# Patient Record
Sex: FEMALE | Race: WHITE | Employment: OTHER | URBAN - METROPOLITAN AREA
[De-identification: names, ages, dates, MRNs, and addresses within clinical notes are randomized per-mention and may not be internally consistent; named-entity substitution may affect disease eponyms.]

---

## 2017-03-24 DIAGNOSIS — R30.0 DYSURIA: Primary | ICD-10-CM

## 2017-03-24 LAB
ALBUMIN UR-MCNC: ABNORMAL MG/DL
APPEARANCE UR: ABNORMAL
BACTERIA #/AREA URNS HPF: ABNORMAL /HPF
BILIRUB UR QL STRIP: NEGATIVE
COLOR UR AUTO: ABNORMAL
GLUCOSE UR STRIP-MCNC: NEGATIVE MG/DL
HGB UR QL STRIP: ABNORMAL
KETONES UR STRIP-MCNC: NEGATIVE MG/DL
LEUKOCYTE ESTERASE UR QL STRIP: ABNORMAL
NITRATE UR QL: POSITIVE
NON-SQ EPI CELLS #/AREA URNS LPF: ABNORMAL /LPF
PH UR STRIP: 6 PH (ref 5–7)
RBC #/AREA URNS AUTO: ABNORMAL /HPF (ref 0–2)
SP GR UR STRIP: 1.01 (ref 1–1.03)
URN SPEC COLLECT METH UR: ABNORMAL
UROBILINOGEN UR STRIP-ACNC: 0.2 EU/DL (ref 0.2–1)
WBC #/AREA URNS AUTO: ABNORMAL /HPF (ref 0–2)

## 2017-03-24 PROCEDURE — 87186 SC STD MICRODIL/AGAR DIL: CPT | Performed by: FAMILY MEDICINE

## 2017-03-24 PROCEDURE — 81001 URINALYSIS AUTO W/SCOPE: CPT | Performed by: FAMILY MEDICINE

## 2017-03-24 PROCEDURE — 87088 URINE BACTERIA CULTURE: CPT | Performed by: FAMILY MEDICINE

## 2017-03-24 PROCEDURE — 87086 URINE CULTURE/COLONY COUNT: CPT | Performed by: FAMILY MEDICINE

## 2017-03-26 LAB
BACTERIA SPEC CULT: ABNORMAL
MICRO REPORT STATUS: ABNORMAL
MICROORGANISM SPEC CULT: ABNORMAL
SPECIMEN SOURCE: ABNORMAL

## 2017-03-27 ENCOUNTER — APPOINTMENT (OUTPATIENT)
Dept: CT IMAGING | Facility: CLINIC | Age: 77
End: 2017-03-27
Attending: FAMILY MEDICINE
Payer: COMMERCIAL

## 2017-03-27 ENCOUNTER — APPOINTMENT (OUTPATIENT)
Dept: GENERAL RADIOLOGY | Facility: CLINIC | Age: 77
End: 2017-03-27
Attending: FAMILY MEDICINE
Payer: COMMERCIAL

## 2017-03-27 ENCOUNTER — HOSPITAL ENCOUNTER (EMERGENCY)
Facility: CLINIC | Age: 77
Discharge: HOME | End: 2017-03-27
Attending: FAMILY MEDICINE | Admitting: FAMILY MEDICINE
Payer: COMMERCIAL

## 2017-03-27 VITALS
OXYGEN SATURATION: 96 % | WEIGHT: 115 LBS | SYSTOLIC BLOOD PRESSURE: 108 MMHG | DIASTOLIC BLOOD PRESSURE: 58 MMHG | TEMPERATURE: 98.3 F | BODY MASS INDEX: 22.46 KG/M2

## 2017-03-27 DIAGNOSIS — S00.83XA TRAUMATIC HEMATOMA OF FOREHEAD, INITIAL ENCOUNTER: ICD-10-CM
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
## 2017-03-27 DIAGNOSIS — M25.561 ACUTE PAIN OF RIGHT KNEE: ICD-10-CM

## 2017-03-27 DIAGNOSIS — M25.521 RIGHT ELBOW PAIN: ICD-10-CM
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
## 2017-03-27 PROCEDURE — 73562 X-RAY EXAM OF KNEE 3: CPT | Mod: TC,RT

## 2017-03-27 PROCEDURE — 70450 CT HEAD/BRAIN W/O DYE: CPT

## 2017-03-27 PROCEDURE — 72125 CT NECK SPINE W/O DYE: CPT

## 2017-03-27 PROCEDURE — 73060 X-RAY EXAM OF HUMERUS: CPT | Mod: TC,RT

## 2017-03-27 PROCEDURE — 73080 X-RAY EXAM OF ELBOW: CPT | Mod: TC,RT

## 2017-03-27 PROCEDURE — 99284 EMERGENCY DEPT VISIT MOD MDM: CPT | Performed by: FAMILY MEDICINE

## 2017-03-27 PROCEDURE — 99284 EMERGENCY DEPT VISIT MOD MDM: CPT | Mod: 25

## 2017-03-27 NOTE — ED AVS SNAPSHOT
" [facility] Emergency Department    [address] [place] [address]    Phone:  [phone]    Fax:  [phone]                                       Mickie Landin   MRN: [id number]    Department:  [facility] Emergency Department   Date of Visit:  3/27/2017           Patient Information     Date Of Birth          1940        Your diagnoses for this visit were:     Fall at home, initial encounter        You were seen by Moncho Carver MD.      Follow-up Information     Schedule an appointment as soon as possible for a visit with Indira Choudhary PA-C.    Why:  As needed    Contact information:    [facility]  [address] [address]  [phone]          Discharge Instructions         Treatment of swelling and pain after a fall     Rest an injury, elevate it, and use ice and compression as directed.   The xrays from today show that Mickie does not have any fractures. We recommend \"RICE\" for treatment of symptoms.     RICE stands for rest, ice, compression, and elevation. These can limit pain and swelling after an injury. RICE may be recommended to help treat fractures, sprains, strains, and bruises or bumps.   Home care  The following explain the details of RICE:    Rest. Limit the use of the injured body part. This helps prevent further damage to the body part and gives it time to heal. In some cases, you may need a sling, brace, splint, or cast to help keep the body part still until it has healed.    Ice. Applying ice right after an injury helps relieve pain and swelling. Wrap a bag of ice in a thin towel. Then, place it over the injured area. Do this for 10 to 15 minutes every 3 to 4 hours. Continue for the next 1 to 3 days or until your symptoms improve. Never put ice directly on your skin or ice an area longer than 15 minutes at a time.    Compression. Putting pressure on an injury helps reduce swelling and provides support. Wrap the injured " area firmly with an elastic bandage/wrap. Make sure not to wrap the bandage too tightly or you will cut off blood flow to the injured area. If your bandage loosens, rewrap it.    Elevation. Keeping an injury raised above the level of your heart reduces swelling, pain, and throbbing. For instance, if you have a broken leg, it may help to rest your leg on several pillows when sitting or lying down. Try to keep the injured area elevated for at least 2 to 3 hours per day.  Follow-up care  Follow up with your health care provider, or as advised.  When to seek medical advice  Call your health care provider right away if any of these occur:    Fever of 100.4 F (38 C) or higher, or as directed by your health care provider    Increased pain or swelling in the injured body part    Injured body part becomes cold, blue, or numb or tingly    Signs of infection. These include warmth in the skin, redness, drainage, or bad smell coming from the injured body part.    Thank you for choosing our Emergency Department for your care.     Sincerely,    Dr dE Carver M.D.          24 Hour Appointment Hotline       To make an appointment at any [facility], call [phone] ([phone]). If you don't have a family doctor or clinic, we will help you find one. [facility] clinics are conveniently located to serve the needs of you and your family.             Review of your medicines      Our records show that you are taking the medicines listed below. If these are incorrect, please call your family doctor or clinic.        Dose / Directions Last dose taken    albuterol (2.5 MG/3ML) 0.083% neb solution   Dose:  1 ampule   Quantity:  30 vial        Take 3 mLs (2.5 mg) by nebulization every 6 hours as needed for shortness of breath / dyspnea   Refills:  0        ascorbic acid 500 MG tablet   Commonly known as:  VITAMIN C        1 TABLET DAILY   Refills:  0        aspirin 81 MG tablet   Quantity:  100        ONE DAILY   Refills:  3         BIOTENE PBF DRY MOUTH Pste        use twice daily   Refills:  0        calcium-vitamin D 600-400 MG-UNIT per tablet   Commonly known as:  CALTRATE   Dose:  1 tablet        Take 1 tablet by mouth 2 times daily.   Refills:  0        * DEPAKOTE SPRINKLES 125 MG capsule   Generic drug:  divalproex        3 CAPSULES TWICE DAILY AT 8 AM AND 8 PM AND 2 CAPSULES AT NOON DAILY   Refills:  0        * divalproex 125 MG capsule   Commonly known as:  DEPAKOTE SPRINKLE        2 capsules at noon   Refills:  0        fentaNYL 12 mcg/hr 72 hr patch   Commonly known as:  DURAGESIC   Quantity:  10 patch        Place 1 patch every 72 hours as needed. Hold if lethargic   Refills:  0        fluticasone 50 MCG/ACT spray   Commonly known as:  FLONASE   Dose:  2 spray   Quantity:  1 Package        2 sprays by Both Nostrils route daily.   Refills:  12        glucosamine chondroitin 500 complex Caps        2 CAPSULES DAILY   Refills:  0        LANsoprazole 30 MG CR capsule   Commonly known as:  PREVACID   Dose:  30 mg   Quantity:  90 capsule        Take 1 capsule (30 mg) by mouth daily May sprinkle on food   Refills:  3        MIRALAX powder   Dose:  1 capful   Quantity:  510 g   Generic drug:  polyethylene glycol        Take 17 g by mouth daily.   Refills:  1        MULTIVITAMINS PO   Dose:  1 tablet        Take 1 tablet by mouth daily. Crushed and mixed with applesauce   Refills:  0        * order for DME   Quantity:  1 Device        Equipment being ordered: bed rails   Refills:  0        * order for DME   Quantity:  2 Device        Equipment being ordered: nebulizer mask   Refills:  1        THICK-IT #2 PO        Add to all liquids   Refills:  0        tolterodine 1 MG tablet   Commonly known as:  DETROL   Dose:  1 mg   Quantity:  180 tablet        Take 1 tablet by mouth 2 times daily.   Refills:  3        TYLENOL PM EXTRA STRENGTH  MG tablet   Quantity:  100   Generic drug:  diphenhydrAMINE-acetaminophen        1 TABLET AT  "BEDTIME AS NEEDED FOR SLEEP   Refills:  3        * Notice:  This list has 4 medication(s) that are the same as other medications prescribed for you. Read the directions carefully, and ask your doctor or other care provider to review them with you.            Procedures and tests performed during your visit     Cervical spine CT w/o contrast    Head CT w/o contrast    Humerus XR, G/E 2 views, right    Knee XR, 3 views, right    XR Elbow Right G/E 3 Views      Orders Needing Specimen Collection     None      Pending Results     Date and Time Order Name Status Description    3/27/2017 1143 XR Elbow Right G/E 3 Views Preliminary     3/27/2017 1143 Humerus XR, G/E 2 views, right Preliminary     3/27/2017 1143 Knee XR, 3 views, right Preliminary     3/27/2017 0956 Cervical spine CT w/o contrast Preliminary     3/27/2017 0956 Head CT w/o contrast Preliminary             Pending Culture Results     No orders found from 3/25/2017 to 3/28/2017.            Thank you for choosing [facility]       Thank you for choosing [facility] for your care. Our goal is always to provide you with excellent care. Hearing back from our patients is one way we can continue to improve our services. Please take a few minutes to complete the written survey that you may receive in the mail after you visit with us. Thank you!        [employer] Information     [facility] lets you send messages to your doctor, view your test results, renew your prescriptions, schedule appointments and more. To sign up, go to [email or web address] . Click on \"Log in\" on the left side of the screen, which will take you to the Welcome page. Then click on \"Sign up Now\" on the right side of the page.     You will be asked to enter the access code listed below, as well as some personal information. Please follow the directions to create your username and password.     Your access code is: [email or web address]  Expires: 2017  1:13 PM     Your access code will  in 90 days. If you " need help or a new code, please call your [facility] clinic or [phone].        Care EveryWhere ID     This is your Care EveryWhere ID. This could be used by other organizations to access your [facility] medical records  [id number]        After Visit Summary       This is your record. Keep this with you and show to your community pharmacist(s) and doctor(s) at your next visit.

## 2017-03-27 NOTE — ED NOTES
Presents from group home with care giver after fall from bed. Hit forehead on her joan lift on the floor and bruising noted. Pt is non verbal but does not appear to be in any distress. Care giver states the bed is about three feet off floor.

## 2017-03-27 NOTE — DISCHARGE INSTRUCTIONS
"  Treatment of swelling and pain after a fall     Rest an injury, elevate it, and use ice and compression as directed.   The xrays from today show that Mickie does not have any fractures. We recommend \"RICE\" for treatment of symptoms.     RICE stands for rest, ice, compression, and elevation. These can limit pain and swelling after an injury. RICE may be recommended to help treat fractures, sprains, strains, and bruises or bumps.   Home care  The following explain the details of RICE:    Rest. Limit the use of the injured body part. This helps prevent further damage to the body part and gives it time to heal. In some cases, you may need a sling, brace, splint, or cast to help keep the body part still until it has healed.    Ice. Applying ice right after an injury helps relieve pain and swelling. Wrap a bag of ice in a thin towel. Then, place it over the injured area. Do this for 10 to 15 minutes every 3 to 4 hours. Continue for the next 1 to 3 days or until your symptoms improve. Never put ice directly on your skin or ice an area longer than 15 minutes at a time.    Compression. Putting pressure on an injury helps reduce swelling and provides support. Wrap the injured area firmly with an elastic bandage/wrap. Make sure not to wrap the bandage too tightly or you will cut off blood flow to the injured area. If your bandage loosens, rewrap it.    Elevation. Keeping an injury raised above the level of your heart reduces swelling, pain, and throbbing. For instance, if you have a broken leg, it may help to rest your leg on several pillows when sitting or lying down. Try to keep the injured area elevated for at least 2 to 3 hours per day.  Follow-up care  Follow up with your health care provider, or as advised.  When to seek medical advice  Call your health care provider right away if any of these occur:    Fever of 100.4 F (38 C) or higher, or as directed by your health care provider    Increased pain or swelling in the " injured body part    Injured body part becomes cold, blue, or numb or tingly    Signs of infection. These include warmth in the skin, redness, drainage, or bad smell coming from the injured body part.    Thank you for choosing our Emergency Department for your care.     Sincerely,    Dr Ed Carver M.D.

## 2017-03-27 NOTE — ED NOTES
Discussion with care giver. Luz Maria agrees that patient should have xray's of right knee and elbow. Pt unable to report pain. Bruising and swelling noted to affected areas

## 2017-03-27 NOTE — ED AVS SNAPSHOT
[facility] Emergency Department    [address] [place] [address]    Phone:  [phone]    Fax:  [phone]                                       Mickie Landin   MRN: [id number]    Department:  [facility] Emergency Department   Date of Visit:  3/27/2017           After Visit Summary Signature Page     I have received my discharge instructions, and my questions have been answered. I have discussed any challenges I see with this plan with the nurse or doctor.    ..........................................................................................................................................  Patient/Patient Representative Signature      ..........................................................................................................................................  Patient Representative Print Name and Relationship to Patient    ..................................................               ................................................  Date                                            Time    ..........................................................................................................................................  Reviewed by Signature/Title    ...................................................              ..............................................  Date                                                            Time

## 2017-03-27 NOTE — ED PROVIDER NOTES
History     Chief Complaint   Patient presents with     Fall     The history is provided by a caregiver.     Mickie Landin is a 77 year old female who lives at a group home in [place].  Today they were transferring her with a Trung lift and when the staff was trying to get the lift out from under Mickie, she fell forward onto the floor.  She hit her forehead, her right arm and her right knee.  Mickie is not verbal so they brought her in for evaluation since they could not determine if she had significant pain.     I have reviewed the Medications, Allergies, Past Medical and Surgical History, and Social History in the Epic system.    Review of Systems   Unable to perform ROS: Patient nonverbal       Physical Exam   BP: 108/58  Heart Rate: 73  Temp: 98.3  F (36.8  C)  Weight: 52.2 kg (115 lb)  SpO2: 96 %    Physical Exam   Constitutional: No distress.   HENT:   Head: Normocephalic and atraumatic.   Right Ear: External ear normal.   Left Ear: External ear normal.   Mouth/Throat: Oropharynx is clear and moist.   Small hematoma on the left forehead   Eyes: Conjunctivae and EOM are normal.   Neck: Normal range of motion.   Cardiovascular: Normal rate and normal heart sounds.    No murmur heard.  Pulmonary/Chest: Effort normal and breath sounds normal. No respiratory distress.   Abdominal: Soft. Bowel sounds are normal.   Musculoskeletal:   She has some swelling of the right humerus and right elbow.  No obvious deformity.  She has some swelling of the right knee, no obvious deformity.   Neurological: She is alert.   Skin:   She has a hematoma on the forehead over the left eye.  She has skin abrasions on the right humerus and right elbow.  No active bleeding.  She has skin abrasions on the right knee with no active bleeding.  No other skin abrasions or bleeding.       ED Course     ED Course     Procedures    Results for orders placed or performed during the hospital encounter of 03/27/17 (from the past 24 hour(s))    Cervical spine CT w/o contrast    Narrative    CT OF THE CERVICAL SPINE WITHOUT CONTRAST   3/27/2017 10:58 AM     COMPARISON: None.    HISTORY: Unprotected fall.     TECHNIQUE: Axial images of the cervical spine were acquired without  intravenous contrast. Multiplanar reformations were created.      FINDINGS: There is normal alignment of the cervical vertebrae;  however, there is straightening of normal cervical lordosis. Vertebral  body heights of the cervical spine are normal. Craniocervical  alignment is normal. There are no fractures of the cervical spine.  There is severe degenerative endplate spurring at all levels of the  cervical spine with the exception of the relatively normal-appearing  C2-C3 level. There is severe facet arthropathy throughout the   cervical spine. There is moderate degenerative spinal canal narrowing  from C3 down to C6.      Impression    IMPRESSION: Severe diffuse degenerative changes of the cervical spine  as described above. No evidence for fracture or traumatic  malalignment.      Radiation dose for this scan was reduced using automated exposure  control, adjustment of the mA and/or kV according to patient size, or  iterative reconstruction technique.   Head CT w/o contrast    Narrative    CT OF THE HEAD WITHOUT CONTRAST 3/27/2017 10:59 AM     COMPARISON: None.    HISTORY: Unprotected fall.    TECHNIQUE: 5 mm thick axial CT images of the head were acquired  without IV contrast material.    FINDINGS:  There is moderate diffuse cerebral volume loss. There are  subtle patchy areas of decreased density in the cerebral white matter  bilaterally that are consistent with sequela of chronic small vessel  ischemic disease.    The ventricles and basal cisterns are within normal limits in  configuration given the degree of cerebral volume loss.  There is no  midline shift. There are no extra-axial fluid collections.    No intracranial hemorrhage, mass or recent infarct.    The visualized  paranasal sinuses are well-aerated. There is no  mastoiditis. There are no fractures of the visualized bones.      Impression    IMPRESSION:  Diffuse cerebral volume loss and cerebral white matter  changes consistent with chronic small vessel ischemic disease. No  evidence for acute intracranial pathology.       Radiation dose for this scan was reduced using automated exposure  control, adjustment of the mA and/or kV according to patient size, or  iterative reconstruction technique.   Knee XR, 3 views, right    Narrative    RIGHT KNEE THREE VIEWS   3/27/2017 12:19 PM     HISTORY: Pain.    COMPARISON: Contralateral (left) knee x-rays dated 6/16/2009.    FINDINGS: There is diffuse osteopenia. No fracture or malalignment is  identified. Mild enthesopathic changes are seen at the quadriceps  tendon insertion into the patella. Joint spaces are somewhat difficult  to evaluate given the angulation of the images.      Impression    IMPRESSION:  1. No acute fracture or malalignment.  2. Diffuse osteopenia.  3. Enthesopathic changes of the quadriceps tendon insertion into the  patella.   Humerus XR, G/E 2 views, right    Narrative    RIGHT HUMERUS TWO OR MORE VIEWS   3/27/2017 12:20 PM     HISTORY: Pain.    COMPARISON: None.    FINDINGS:  No acute fracture or malalignment. There is diffuse  osteopenia. Large osteophytes are seen at the humeral head with  subchondral cyst formation also noted in the glenoid and possible  humeral head.      Impression    IMPRESSION:   1. No acute fracture or dislocation.  2. Moderate degenerative changes of the right shoulder.  3. Diffuse osteopenia.    XR Elbow Right G/E 3 Views    Narrative    RIGHT ELBOW THREE OR MORE VIEWS   3/27/2017 12:21 PM     HISTORY: Fall.    COMPARISON: None.    FINDINGS:  No acute fractures or dislocations. Alignment is anatomic.  Soft tissues are normal.   No anterior or posterior fat pad elevation  to suggest occult fracture. Enthesopathic changes are seen at  the  triceps tendon insertion into the olecranon process.      Impression    IMPRESSION: No fractures or dislocations. Enthesopathic changes of the  triceps tendon insertion into the olecranon are noted.         Medications - No data to display      Assessments & Plan (with Medical Decision Making)  Mickie came to the emergency department today after she fell at her group home.  They were moving her with a Trung lift and she tumbled out of bed forward.  She landed on her forehead, right arm and right knee.  She is nonverbal so they brought her in for evaluation because is very difficult to ascertain how seriously she was injured.  CT scan of the head and neck was normal.  X-rays of the right elbow, right humerus and right knee were normal.  The patient seems to be in her usual state of health.  Luz Maria, one of her long-term caregivers, is here with her and thinks that Mickie is stable.  She was discharged from the ED.       I have reviewed the nursing notes.    I have reviewed the findings, diagnosis, plan and need for follow up with the patient.    New Prescriptions    No medications on file       Final diagnoses:   Fall at home, initial encounter     Final Diagnosis:  - Fall at home  - right elbow pain  - acute right knee pain  - traumatic hematoma to forehead      3/27/2017   [facility] EMERGENCY DEPARTMENT     Moncho Carver MD  03/27/17 1320       Moncho Carver MD  03/31/17 1722

## 2017-04-14 ENCOUNTER — OFFICE VISIT (OUTPATIENT)
Dept: SURGERY | Facility: OTHER | Age: 77
End: 2017-04-14
Payer: COMMERCIAL

## 2017-04-14 ENCOUNTER — TELEPHONE (OUTPATIENT)
Dept: SURGERY | Facility: OTHER | Age: 77
End: 2017-04-14

## 2017-04-14 VITALS — RESPIRATION RATE: 16 BRPM | HEART RATE: 72 BPM | TEMPERATURE: 97.6 F

## 2017-04-14 DIAGNOSIS — S31.819A BUTTOCK WOUND, RIGHT, INITIAL ENCOUNTER: Primary | ICD-10-CM

## 2017-04-14 DIAGNOSIS — S31.829A BUTTOCK WOUND, LEFT, INITIAL ENCOUNTER: ICD-10-CM

## 2017-04-14 PROCEDURE — 99213 OFFICE O/P EST LOW 20 MIN: CPT | Performed by: SPECIALIST

## 2017-04-14 NOTE — NURSING NOTE
Estimated body mass index is 22.46 kg/(m^2) as calculated from the following:    Height as of 10/11/11: 1.524 m (5').    Weight as of 3/27/17: 52.2 kg (115 lb).  BP Readings from Last 1 Encounters:   03/27/17 108/58   ]  BP cuff size:  NA (Not Taken)  Do you feel safe in your environment?  Unable to answer  Does the patient need any medication refills today? No    No weight patient wheelchair bound

## 2017-04-14 NOTE — NURSING NOTE
Pt here for f/u of buttocks wounds. Has kissing wounds on buttocks as well as skin opening in crease. The open skin is clean and red. On kissing wounds the left side has open skin that is red and clean, the right side has crusty covering.  Non adhesive foam narrow strip placed between buttocks. Order faxed the [facility] for interdry roll. KASANDRA Ann

## 2017-04-14 NOTE — PROGRESS NOTES
F/U for Buttock wound.    Subjective:  Patient is a 75-year-old developmentally delayed white female who lives in a group home and spends most of her time in a custom-made wheelchair. She is here today with her caregiver.  She was last seen by me in 2015 and the wounds were healed.  The wheelchair was evaluated at [facility] at that time and was found to give adequate offloading.      The wounds reopened several months ago.  They have been treated with telfa and bacitracin without relief    She now presents again for follow-up.    Objective:  B/P: Data Unavailable, T: 97.6, P: 72, R: 16  Back: Kissing left and white buttock wounds. - left 0.5x0.8cm, right 1x0.5cm  Psych: non verbal  Neuro: lower ext contracted      Assessmentplan:  This is a 75-year-old the developmentally delayed lady who spends most of her time in a wheelchair.  She developed new kissing lesions on her buttocks and I suspect are due to them rubbing.   The plan is start interdry to the area and have her chair remapped.  She will f/u with me in 3 weeks.    Sahil Salinas MD, FACS

## 2017-04-14 NOTE — TELEPHONE ENCOUNTER
I spoke with Dr. Salinas. He ordered tegederm to be placed on each buttocks and nu skin to be placed on sacral wound. I have spoken to Luz Maria, I will write up the orders for these and fax it to the group home. Orders were faxed to [facility]. KASANDRA Ann

## 2017-04-14 NOTE — TELEPHONE ENCOUNTER
Reason for call:  Anita from [facility] calling to report that the interdry is not covered by the pt's ins and would cost her $109 a roll.  She requests a different supply be ordered.  Please call anita to advise.  thanks

## 2017-04-14 NOTE — LETTER
[facility]  [address]  [place] [address]  [phone]        April 14, 2017    Re:Mickie Ballardody       [id number]       [address]       [place] [address]          To Whom It May Concern,    Orders for wound care:   Tegaderm [id number] to be placed on each buttocks wound, cut product in half, change weekly or as needed if the edges start to come loose                                          Nu skin to sacral wound daily and prn    Sincerely,        Dr. ТАТЬЯНА Salinas MD

## 2017-04-14 NOTE — MR AVS SNAPSHOT
"              After Visit Summary   4/14/2017    Mickie Landin    MRN: [id number]           Patient Information     Date Of Birth          1940        Visit Information        Provider Department      4/14/2017 9:15 AM Sahil Salinas MD [facility]        Today's Diagnoses     Buttock wound, right, initial encounter    -  1    Buttock wound, left, initial encounter           Follow-ups after your visit        Your next 10 appointments already scheduled     May 05, 2017  9:30 AM CDT   Return Visit with Sahil Salinas MD   [facility]    [address]  [place] [id number] [phone]              Who to contact     If you have questions or need follow up information about today's clinic visit or your schedule please contact [facility] directly at [phone].  Normal or non-critical lab and imaging results will be communicated to you by MyChart, letter or phone within 4 business days after the clinic has received the results. If you do not hear from us within 7 days, please contact the clinic through MyChart or phone. If you have a critical or abnormal lab result, we will notify you by phone as soon as possible.  Submit refill requests through [employer] or call your pharmacy and they will forward the refill request to us. Please allow 3 business days for your refill to be completed.          Additional Information About Your Visit        MyChart Information     [employer] lets you send messages to your doctor, view your test results, renew your prescriptions, schedule appointments and more. To sign up, go to [email or web address] . Click on \"Log in\" on the left side of the screen, which will take you to the Welcome page. Then click on \"Sign up Now\" on the right side of the page.     You will be asked to enter the access code listed below, as well as some personal information. Please follow the directions to " create your username and password.     Your access code is: [email or web address]  Expires: 2017  1:13 PM     Your access code will  in 90 days. If you need help or a new code, please call your [facility] or [phone].        Care EveryWhere ID     This is your Care EveryWhere ID. This could be used by other organizations to access your [place] medical records  [id number]        Your Vitals Were     Pulse Temperature Respirations             72 97.6  F (36.4  C) (Temporal) 16          Blood Pressure from Last 3 Encounters:   17 108/58   14 118/74   13 116/72    Weight from Last 3 Encounters:   17 115 lb (52.2 kg)   11/06/15 106 lb 4.8 oz (48.2 kg)   13 91 lb 8 oz (41.5 kg)              Today, you had the following     No orders found for display         Today's Medication Changes          These changes are accurate as of: 17  9:39 AM.  If you have any questions, ask your nurse or doctor.               Start taking these medicines.        Dose/Directions    * order for DME   Used for:  Buttock wound, right, initial encounter, Buttock wound, left, initial encounter   Started by:  Sahil Salinas MD        Equipment being ordered: Interdry Roll   Quantity:  1 Box   Refills:  3       * order for DME   Used for:  Buttock wound, right, initial encounter, Buttock wound, left, initial encounter   Started by:  Sahil Salinas MD        Equipment being ordered: Interdry roll.  Apply to buttock crease daily and PRN   Quantity:  1 Box   Refills:  3       * Notice:  This list has 2 medication(s) that are the same as other medications prescribed for you. Read the directions carefully, and ask your doctor or other care provider to review them with you.         Where to get your medicines      Some of these will need a paper prescription and others can be bought over the counter.  Ask your nurse if you have questions.     Bring a paper prescription for each of these medications      order for DME    order for DME                Primary Care Provider Office Phone # Fax #    Indira Choudhary PA-C [phone]       [facility] PHYSICIAN [facility] [address] [address]        Thank you!     Thank you for choosing [facility]  for your care. Our goal is always to provide you with excellent care. Hearing back from our patients is one way we can continue to improve our services. Please take a few minutes to complete the written survey that you may receive in the mail after your visit with us. Thank you!             Your Updated Medication List - Protect others around you: Learn how to safely use, store and throw away your medicines at www.disposemymeds.org.          This list is accurate as of: 4/14/17  9:39 AM.  Always use your most recent med list.                   Brand Name Dispense Instructions for use    albuterol (2.5 MG/3ML) 0.083% neb solution     30 vial    Take 3 mLs (2.5 mg) by nebulization every 6 hours as needed for shortness of breath / dyspnea       ascorbic acid 500 MG tablet    VITAMIN C     1 TABLET DAILY       aspirin 81 MG tablet     100    ONE DAILY       BIOTENE PBF DRY MOUTH Pste      use twice daily       calcium-vitamin D 600-400 MG-UNIT per tablet    CALTRATE     Take 1 tablet by mouth 2 times daily.       * DEPAKOTE SPRINKLES 125 MG capsule   Generic drug:  divalproex      3 CAPSULES TWICE DAILY AT 8 AM AND 8 PM AND 2 CAPSULES AT NOON DAILY       * divalproex 125 MG capsule    DEPAKOTE SPRINKLE     2 capsules at noon       fentaNYL 12 mcg/hr 72 hr patch    DURAGESIC    10 patch    Place 1 patch every 72 hours as needed. Hold if lethargic       fluticasone 50 MCG/ACT spray    FLONASE    1 Package    2 sprays by Both Nostrils route daily.       glucosamine chondroitin 500 complex Caps      2 CAPSULES DAILY       LANsoprazole 30 MG CR capsule    PREVACID    90 capsule    Take 1 capsule (30 mg) by mouth daily May sprinkle on food        MELATONIN PO          MIRALAX powder   Generic drug:  polyethylene glycol     510 g    Take 17 g by mouth daily.       MULTIVITAMINS PO      Take 1 tablet by mouth daily. Crushed and mixed with applesauce       * order for DME     1 Device    Equipment being ordered: bed rails       * order for DME     2 Device    Equipment being ordered: nebulizer mask       * order for DME     1 Box    Equipment being ordered: Interdry Roll       * order for DME     1 Box    Equipment being ordered: Interdry roll.  Apply to buttock crease daily and PRN       THICK-IT #2 PO      Add to all liquids       tolterodine 1 MG tablet    DETROL    180 tablet    Take 1 tablet by mouth 2 times daily.       TYLENOL PM EXTRA STRENGTH  MG tablet   Generic drug:  diphenhydrAMINE-acetaminophen     100    1 TABLET AT BEDTIME AS NEEDED FOR SLEEP       * Notice:  This list has 6 medication(s) that are the same as other medications prescribed for you. Read the directions carefully, and ask your doctor or other care provider to review them with you.

## 2017-04-17 ENCOUNTER — TELEPHONE (OUTPATIENT)
Dept: FAMILY MEDICINE | Facility: OTHER | Age: 77
End: 2017-04-17

## 2017-04-17 NOTE — TELEPHONE ENCOUNTER
Reason for call:  Form  Reason for Call:  Form, our goal is to have forms completed with 72 hours, however, some forms may require a visit or additional information.    Type of letter, form or note:  medical    Who is the form from?: [employer] (if other please explain)    Where did the form come from: form was faxed in    What clinic location was the form placed at?: [facility] - [phone]    Where the form was placed: 's Box    What number is listed as a contact on the form?: [phone]        Additional comments: sign fax back    Call taken on 4/17/2017 at 11:02 AM by Pratibha Willson

## 2017-04-18 ENCOUNTER — TELEPHONE (OUTPATIENT)
Dept: SURGERY | Facility: OTHER | Age: 77
End: 2017-04-18

## 2017-04-18 NOTE — TELEPHONE ENCOUNTER
Latisha from patient's group home would like to talk to someone about the directions for patient's scripts for Tegaderm and Nu skin. Please call Latisha at [phone].    Thank you Grace

## 2017-04-18 NOTE — TELEPHONE ENCOUNTER
Asking about tegederm drsg that it is getting wet.. I told staff person that they were sent the incorrect product because tegederm is clear with adhesive. Latisha will contact the pharmacy to see what happened. KASANDRA Ann

## 2017-04-18 NOTE — TELEPHONE ENCOUNTER
I haven't seen patient since 2014, I believe the group home has a contract with someone who provides in-home services.  I marked this on the form to redirect to her new provider, please send back.

## 2017-04-19 ENCOUNTER — TELEPHONE (OUTPATIENT)
Dept: FAMILY MEDICINE | Facility: OTHER | Age: 77
End: 2017-04-19

## 2017-04-19 NOTE — TELEPHONE ENCOUNTER
Reason for Call:  Form, our goal is to have forms completed with 72 hours, however, some forms may require a visit or additional information.    Type of letter, form or note:  medical    Who is the form from?: [facility] (if other please explain)    Where did the form come from: form was faxed in    What clinic location was the form placed at?: [facility] - [phone]    Where the form was placed: 's Box    What number is listed as a contact on the form?:[phone]       Additional comments: please review form,complete, sign date  And return to fax [phone]    Call taken on 4/19/2017 at 4:32 PM by aGil Walters

## 2017-04-19 NOTE — TELEPHONE ENCOUNTER
Form placed in Dr. Salinas's box as it is in regards to wound care with tagaderm and Dr. Salinas had just order the supplies. Routed to provider. Shirley Flores CMA

## 2017-04-24 ENCOUNTER — TELEPHONE (OUTPATIENT)
Dept: SURGERY | Facility: OTHER | Age: 77
End: 2017-04-24

## 2017-04-24 NOTE — TELEPHONE ENCOUNTER
Reason for call: Latisha is calling from the patients home care to talk with  or staff in regards to sores that got re-opened on buttocks. They were using the tegederm with adhesive on both buttocks, but taking it off took off the scab and developed a fresh new sore in the same area. Please advise.

## 2017-04-24 NOTE — LETTER
[facility]  [address]  [place] [address] [phone]        April 26, 2017    Re:Mickie Landin       [id number]      [place] [address]          To Whom It May Concern,    New wound care orders:   Apply Nu Skin to all 3 wounds-buttocks x2 and gluteal cleft.   Then placed Tegederm over all 3 wounds, can use one piece and cut to make it fit or just place one piece over all wounds if it will cover all.   Change this weekly or if edges are peeling and dressing is loose.    Sincerely,        Dr. ТАТЬЯНА Salinas MD

## 2017-04-24 NOTE — TELEPHONE ENCOUNTER
I spoke to Luz Maria. She states that scab that was on both buttocks wounds came off and both sites were bldg this am. Both areas were looking very good.   Being Dr. Salinas is out this week we will have to wait and talk with him next week to see if he has other suggestions for them. Luz Maria did say that the skin break in her gluteal cleft remains open, not much change.KASANDRA Ann

## 2017-04-26 NOTE — TELEPHONE ENCOUNTER
I spoke with MD today. He ordered putting Nu skin on all 3 wounds then cover with Tegederm, change when edges are peeling or product loose or weekly.  New orders written and faxed to [facility]audhry. KASANDRA Ann

## 2017-04-28 ENCOUNTER — TELEPHONE (OUTPATIENT)
Dept: SURGERY | Facility: OTHER | Age: 77
End: 2017-04-28

## 2017-04-28 NOTE — TELEPHONE ENCOUNTER
Reason for call: Latisha from the assistant living is calling to see if the tegaderm can be replaced with something else because it is not staying on at all due to being in a wheel and clinching. Please advise.

## 2017-04-28 NOTE — TELEPHONE ENCOUNTER
Reason for call:  tegaderm says replace daily but on referral it says weekly.   Luz Maria [facility]gers [facility] [phone]

## 2017-04-28 NOTE — TELEPHONE ENCOUNTER
I could offer her no sooner appt. I talked to Luz Maria. Pt is having trouble keeping tegederm in placed, she wears a brief and scoots around a lot in her chair so this will not help the tegederm to stay in place. Also has scratches on her buttocks also, not sure where that came from. Will need to see pt next week and see. KASANDRA Ann

## 2017-05-02 ENCOUNTER — MEDICAL CORRESPONDENCE (OUTPATIENT)
Dept: HEALTH INFORMATION MANAGEMENT | Facility: CLINIC | Age: 77
End: 2017-05-02

## 2017-05-02 ENCOUNTER — TELEPHONE (OUTPATIENT)
Dept: FAMILY MEDICINE | Facility: OTHER | Age: 77
End: 2017-05-02

## 2017-05-02 NOTE — TELEPHONE ENCOUNTER
Reason for Call:  Form, our goal is to have forms completed with 72 hours, however, some forms may require a visit or additional information.    Type of letter, form or note:  medical    Who is the form from?: [employer] (if other please explain)    Where did the form come from: form was faxed in    What clinic location was the form placed at?: [facility] - [phone]    Where the form was placed: 's Box    What number is listed as a contact on the form?: [phone]       Additional comments: signature required    Call taken on 5/2/2017 at 9:16 AM by Carlota Guthrie

## 2017-05-03 ENCOUNTER — TELEPHONE (OUTPATIENT)
Dept: SURGERY | Facility: OTHER | Age: 77
End: 2017-05-03

## 2017-05-03 NOTE — TELEPHONE ENCOUNTER
I talked to Luz Maria. They are having what sounds like skin breakdown from the adhesive on the tegederm. Pt moves around in her chair and this more than likely is causing friction and skin tearing, but as I explained to Luz Maria without us seeing it I feel we just need to wait to change things up until pt's appt in 2 days. KASANDRA Ann

## 2017-05-03 NOTE — TELEPHONE ENCOUNTER
Reason for call:    Regarding wound on her bottom. Discuss changing how they place the Tegaderm.    Caller Luz Maria group [facility]. [phone]

## 2017-05-05 ENCOUNTER — TELEPHONE (OUTPATIENT)
Dept: SURGERY | Facility: OTHER | Age: 77
End: 2017-05-05

## 2017-05-05 ENCOUNTER — TELEPHONE (OUTPATIENT)
Dept: NURSING | Facility: CLINIC | Age: 77
End: 2017-05-05

## 2017-05-05 ENCOUNTER — OFFICE VISIT (OUTPATIENT)
Dept: SURGERY | Facility: OTHER | Age: 77
End: 2017-05-05
Payer: COMMERCIAL

## 2017-05-05 VITALS — OXYGEN SATURATION: 95 % | HEART RATE: 92 BPM | TEMPERATURE: 97 F

## 2017-05-05 DIAGNOSIS — S31.000A WOUND OF SACRAL REGION, INITIAL ENCOUNTER: Primary | ICD-10-CM

## 2017-05-05 DIAGNOSIS — S31.000A: Primary | ICD-10-CM

## 2017-05-05 PROCEDURE — 99213 OFFICE O/P EST LOW 20 MIN: CPT | Performed by: SPECIALIST

## 2017-05-05 NOTE — TELEPHONE ENCOUNTER
Call Type: Triage Call    Presenting Problem: Group home attendant, Luz Maria, calling to see if the  clinic 'reordered the compound formula for butt paste for the  pharmacy.' Chart states RX addendum completed at 17:10 by CECI Higgins with [facility] by Dr Salinas.  Attendant will call pharmacy.  Triage Note:  Guideline Title: Medication Questions - Adult  Recommended Disposition: Provide Health Information  Original Inclination: Would have called clinic  Override Disposition:  Intended Action: Follow advice given  Physician Contacted: No  Caller has medication question(s) that was answered with available resources ?  YES  Pregnant and has medication questions regarding prescribed and/or nonprescribed  medication(s) not covered by available resources ? NO  Breastfeeding and has medication questions regarding prescribed and/or  nonprescribed medication(s) not covered by available resources ? NO  Requested information on how to safely dispose of  or unused medications ?  NO  Sign(s) or symptom(s) associated with a diagnosed condition or with a new illness  ? NO  Prescription ordered today and not available at pharmacy putting patient at  clinical risk ? NO  Recurrence of a symptom(s) or illness post prescribed medication treatment AND  provider instructed patient to call if symptom(s) returned. ? NO  Unable to obtain prescribed medication related to available resources AND  situation poses immediate clinical risk ? NO  Pharmacy calling to clarify prescription order. ? NO  Requests refill of prescribed medication that does NOT have a valid refill; lack  of medication may cause clinical risk to patient if not available. ? NO  Has questions about prescribed and/or nonprescribed medications not covered by  available resources ? NO  Pharmacy calling with prescription question; answered per department policy. ? NO  Requests refill of prescribed medication without valid refills OR requests refill  of prescribed medication with  valid refills but does not have prescription number  (no RX container); lack of medication does not put patient at clinical risk ? NO  Physician Instructions:  Care Advice:

## 2017-05-05 NOTE — PROGRESS NOTES
F/U for Buttock wound.    Subjective:  Patient is a 75-year-old developmentally delayed white female who lives in a group home and spends most of her time in a custom-made wheelchair. She is here today with her caregiver.  She was last seen by me in 2015 and the wounds were healed.  The wheelchair was evaluated at [facility] at that time and was found to give adequate offloading.      The wounds reopened several months ago.  They have been treated with tegaderm and nuskin    She now presents again for follow-up.    Objective:  B/P: Data Unavailable, T: 97, P: 92, R: Data Unavailable  Back: Kissing left and white buttock wounds. - healed - new sacral wound from maceration. - 3.4x4.5cm - 100% pink tissue.  Maceration a skin edges    Psych: non verbal  Neuro: lower ext contracted      Assessmentplan:  This is a 75-year-old the developmentally delayed lady who spends most of her time in a wheelchair.  She developed new kissing lesions on her buttocks and I suspect are due to them rubbing - those have healed.  New sacral wound developed from maceration.  .   The plan is start butt paste to new wound.   Stop tegaderm and nuskin.   She will f/u with me in 2 weeks.    Sahil Salinas MD, FACS

## 2017-05-05 NOTE — TELEPHONE ENCOUNTER
Please call [facility] back they need to know the active ingredients in the Butt Paste that was ordered so they can make the compound, thanks  Sheela MEDINA (R)

## 2017-05-05 NOTE — NURSING NOTE
Chief Complaint   Patient presents with     RECHECK     WOUND CARE     buttock wound       Initial Pulse 92  Temp 97  F (36.1  C) (Temporal)  SpO2 95% Estimated body mass index is 22.46 kg/(m^2) as calculated from the following:    Height as of 10/11/11: 1.524 m (5').    Weight as of 3/27/17: 52.2 kg (115 lb).  Medication Reconciliation: complete

## 2017-05-05 NOTE — TELEPHONE ENCOUNTER
Reason for Call:  Other prescription    Detailed comments: [facility] for [facility] of butt paste. Please advise and contact Luz Maria at  [phone]          Best Time: ANY    Can we leave a detailed message on this number? YES    Call taken on 5/5/2017 at 3:25 PM by Gail Walters

## 2017-05-05 NOTE — MR AVS SNAPSHOT
"              After Visit Summary   5/5/2017    Mickie Landin    MRN: [id number]           Patient Information     Date Of Birth          1940        Visit Information        Provider Department      5/5/2017 9:30 AM Sahil Salinas MD [facility]        Today's Diagnoses     Wound of sacral region, initial encounter    -  1       Follow-ups after your visit        Your next 10 appointments already scheduled     May 19, 2017  9:15 AM CDT   Return Visit with Sahil Salinas MD   [facility]    [address]  [place] [address] [phone]              Who to contact     If you have questions or need follow up information about today's clinic visit or your schedule please contact [facility] directly at [phone].  Normal or non-critical lab and imaging results will be communicated to you by Picurio[employer], letter or phone within 4 business days after the clinic has received the results. If you do not hear from us within 7 days, please contact the clinic through MyChart or phone. If you have a critical or abnormal lab result, we will notify you by phone as soon as possible.  Submit refill requests through [employer] or call your pharmacy and they will forward the refill request to us. Please allow 3 business days for your refill to be completed.          Additional Information About Your Visit        MyChart Information     [employer] lets you send messages to your doctor, view your test results, renew your prescriptions, schedule appointments and more. To sign up, go to [email or web address] . Click on \"Log in\" on the left side of the screen, which will take you to the Welcome page. Then click on \"Sign up Now\" on the right side of the page.     You will be asked to enter the access code listed below, as well as some personal information. Please follow the directions to create your username and password.     Your access " code is: [email or web address]  Expires: 2017  1:13 PM     Your access code will  in 90 days. If you need help or a new code, please call your [place] clinic or [phone].        Care EveryWhere ID     This is your Care EveryWhere ID. This could be used by other organizations to access your [place] medical records  [id number]        Your Vitals Were     Pulse Temperature Pulse Oximetry             92 97  F (36.1  C) (Temporal) 95%          Blood Pressure from Last 3 Encounters:   17 108/58   14 118/74   13 116/72    Weight from Last 3 Encounters:   17 115 lb (52.2 kg)   11/06/15 106 lb 4.8 oz (48.2 kg)   13 91 lb 8 oz (41.5 kg)              Today, you had the following     No orders found for display       Primary Care Provider Office Phone # Fax #    Indira Choudhary PA-C [phone]       [facility] PHYSICIAN [facility] [address] [address]        Thank you!     Thank you for choosing [facility]  for your care. Our goal is always to provide you with excellent care. Hearing back from our patients is one way we can continue to improve our services. Please take a few minutes to complete the written survey that you may receive in the mail after your visit with us. Thank you!             Your Updated Medication List - Protect others around you: Learn how to safely use, store and throw away your medicines at www.disposemymeds.org.          This list is accurate as of: 17  9:51 AM.  Always use your most recent med list.                   Brand Name Dispense Instructions for use    albuterol (2.5 MG/3ML) 0.083% neb solution     30 vial    Take 3 mLs (2.5 mg) by nebulization every 6 hours as needed for shortness of breath / dyspnea       ascorbic acid 500 MG tablet    VITAMIN C     1 TABLET DAILY       aspirin 81 MG tablet     100    ONE DAILY       BIOTENE PBF DRY MOUTH Pste      use twice daily       calcium-vitamin D 600-400 MG-UNIT per  tablet    CALTRATE     Take 1 tablet by mouth 2 times daily.       * DEPAKOTE SPRINKLES 125 MG capsule   Generic drug:  divalproex      3 CAPSULES TWICE DAILY AT 8 AM AND 8 PM AND 2 CAPSULES AT NOON DAILY       * divalproex 125 MG capsule    DEPAKOTE SPRINKLE     2 capsules at noon       fentaNYL 12 mcg/hr 72 hr patch    DURAGESIC    10 patch    Place 1 patch every 72 hours as needed. Hold if lethargic       fluticasone 50 MCG/ACT spray    FLONASE    1 Package    2 sprays by Both Nostrils route daily.       glucosamine chondroitin 500 complex Caps      2 CAPSULES DAILY       LANsoprazole 30 MG CR capsule    PREVACID    90 capsule    Take 1 capsule (30 mg) by mouth daily May sprinkle on food       MELATONIN PO          MIRALAX powder   Generic drug:  polyethylene glycol     510 g    Take 17 g by mouth daily.       MULTIVITAMINS PO      Take 1 tablet by mouth daily. Crushed and mixed with applesauce       * order for DME     1 Device    Equipment being ordered: bed rails       * order for DME     2 Device    Equipment being ordered: nebulizer mask       * order for DME     1 Box    Equipment being ordered: Interdry Roll       * order for DME     1 Box    Equipment being ordered: Interdry roll.  Apply to buttock crease daily and PRN       * order for DME     1 Box    Equipment being ordered: 4x6 tegaderm  - Apply to buttock daily       * order for DME     1 Box    Equipment being ordered: Nu skin - apply to sacral area daily and prn       THICK-IT #2 PO      Add to all liquids       tolterodine 1 MG tablet    DETROL    180 tablet    Take 1 tablet by mouth 2 times daily.       TYLENOL PM EXTRA STRENGTH  MG tablet   Generic drug:  diphenhydrAMINE-acetaminophen     100    1 TABLET AT BEDTIME AS NEEDED FOR SLEEP       * Notice:  This list has 8 medication(s) that are the same as other medications prescribed for you. Read the directions carefully, and ask your doctor or other care provider to review them with you.

## 2017-05-05 NOTE — NURSING NOTE
PT here for f/u of bottocks wounds. Have had several phone conversations with [facility] staff since last visit. The original wounds have healed, now appears to have open skin related to moisture, see maceration on edges of the wound bed, measures 3.4 x 4.5 cm. Color red. Tender to touch.   Coloplast thick paste applied. Instructed them to clean the wound bed but do not try to scrub off the film that will be left. KASANDRA Ann

## 2017-05-05 NOTE — LETTER
[address]  [facility] [address]  [phone]        May 8, 2017    Re:Mickie Ballardody       [id number] [address]       [address]      [facility] [address]          To Whom It May Concern,    DC the Butt paste order from 5/5/17.  Start Coloplast Critic Aid skin paste, thick moisture barrier paste to buttocks wound BID and prn.  [facility] pharmacy has been called with verbal order.    Sincerely,        Dr. ТАТЬЯНА Salinas MD

## 2017-05-08 NOTE — TELEPHONE ENCOUNTER
Called and gave verbal order from Dr. ТАТЬЯНА Salinas for Coloplast Critic Aid skin paste, thick moisture barrier paste. Faxed new order to the [facility]([phone]) for this change. KASANDRA Ann

## 2017-07-18 ENCOUNTER — MEDICAL CORRESPONDENCE (OUTPATIENT)
Dept: GENERAL RADIOLOGY | Facility: CLINIC | Age: 77
End: 2017-07-18

## 2017-11-10 ENCOUNTER — ALLIED HEALTH/NURSE VISIT (OUTPATIENT)
Dept: FAMILY MEDICINE | Facility: OTHER | Age: 77
End: 2017-11-10
Payer: COMMERCIAL

## 2017-11-10 DIAGNOSIS — Z23 NEED FOR PROPHYLACTIC VACCINATION AND INOCULATION AGAINST INFLUENZA: Primary | ICD-10-CM

## 2017-11-10 PROCEDURE — 90686 IIV4 VACC NO PRSV 0.5 ML IM: CPT

## 2017-11-10 PROCEDURE — 90471 IMMUNIZATION ADMIN: CPT

## 2017-11-10 PROCEDURE — 99207 ZZC NO CHARGE NURSE ONLY: CPT

## 2017-11-10 NOTE — MR AVS SNAPSHOT
"              After Visit Summary   11/10/2017    Mickie Landin    MRN: [id number]           Patient Information     Date Of Birth          1940        Visit Information        Provider Department      11/10/2017 11:00 AM NL FLU SHOT [facility]        Today's Diagnoses     Need for prophylactic vaccination and inoculation against influenza    -  1       Follow-ups after your visit        Who to contact     If you have questions or need follow up information about today's clinic visit or your schedule please contact [facility] directly at [phone].  Normal or non-critical lab and imaging results will be communicated to you by [employer], letter or phone within 4 business days after the clinic has received the results. If you do not hear from us within 7 days, please contact the clinic through [facility] or phone. If you have a critical or abnormal lab result, we will notify you by phone as soon as possible.  Submit refill requests through [facility] [facility] or call your pharmacy and they will forward the refill request to us. Please allow 3 business days for your refill to be completed.          Additional Information About Your Visit        MyChart Information     [facility] lets you send messages to your doctor, view your test results, renew your prescriptions, schedule appointments and more. To sign up, go to [email or web address] . Click on \"Log in\" on the left side of the screen, which will take you to the Welcome page. Then click on \"Sign up Now\" on the right side of the page.     You will be asked to enter the access code listed below, as well as some personal information. Please follow the directions to create your username and password.     Your access code is: [email or web address]  Expires: 2018  1:33 PM     Your access code will  in 90 days. If you need help or a new code, please call your [facility] or [phone].        Care EveryWhere ID     This is your Care " EveryWhere ID. This could be used by other organizations to access your [facility] medical records  [id number]         Blood Pressure from Last 3 Encounters:   03/27/17 108/58   04/23/14 118/74   11/22/13 116/72    Weight from Last 3 Encounters:   03/27/17 115 lb (52.2 kg)   11/06/15 106 lb 4.8 oz (48.2 kg)   01/04/13 91 lb 8 oz (41.5 kg)              We Performed the Following     FLU VAC, SPLIT VIRUS IM > 3 YO (QUADRIVALENT) [01149]     Vaccine Administration, Initial [56018]        Primary Care Provider Office Phone # Fax #    Indira Choudhary PA-C [phone]       [facility] PHYSICIAN [facility] [address] [facility] [address]        Equal Access to Services     DA MCNAIR : Hadii goran bryson Sokatrin, waaxda luqadaha, qaybta kaalmada adri, [place] . So [facility] [phone].    ATENCIÓN: Si habla español, tiene a day disposición servicios gratuitos de asistencia lingüística. [facility] [phone].    We comply with applicable federal civil rights laws and [place] laws. We do not discriminate on the basis of race, color, national origin, age, disability, sex, sexual orientation, or gender identity.            Thank you!     Thank you for choosing [facility]  for your care. Our goal is always to provide you with excellent care. Hearing back from our patients is one way we can continue to improve our services. Please take a few minutes to complete the written survey that you may receive in the mail after your visit with us. Thank you!             Your Updated Medication List - Protect others around you: Learn how to safely use, store and throw away your medicines at www.disposemymeds.org.          This list is accurate as of: 11/10/17  1:33 PM.  Always use your most recent med list.                   Brand Name Dispense Instructions for use Diagnosis    albuterol (2.5 MG/3ML) 0.083% neb solution     30 vial    Take 3 mLs (2.5 mg) by  nebulization every 6 hours as needed for shortness of breath / dyspnea    Wheezing       ascorbic acid 500 MG tablet    VITAMIN C     1 TABLET DAILY        aspirin 81 MG tablet     100    ONE DAILY    Edema       BIOTENE PBF DRY MOUTH Pste      use twice daily        BUTT PASTE - REGULAR    DR LOVE POOP GOOP BUTT PASTE FORMULA    16 oz    Apply topically 2 times daily Apply to buttocks wound BID and prn.    Open wound of sacroiliac region without complication       calcium-vitamin D 600-400 MG-UNIT per tablet    CALTRATE     Take 1 tablet by mouth 2 times daily.        * DEPAKOTE SPRINKLES 125 MG capsule   Generic drug:  divalproex      3 CAPSULES TWICE DAILY AT 8 AM AND 8 PM AND 2 CAPSULES AT NOON DAILY        * divalproex 125 MG capsule    DEPAKOTE SPRINKLE     2 capsules at noon        fentaNYL 12 mcg/hr 72 hr patch    DURAGESIC    10 patch    Place 1 patch every 72 hours as needed. Hold if lethargic    Pain, Spinal stenosis       fluticasone 50 MCG/ACT spray    FLONASE    1 Package    2 sprays by Both Nostrils route daily.    Chronic rhinitis       glucosamine chondroitin 500 complex Caps      2 CAPSULES DAILY        LANsoprazole 30 MG CR capsule    PREVACID    90 capsule    Take 1 capsule (30 mg) by mouth daily May sprinkle on food    Esophageal reflux       MELATONIN PO           MIRALAX powder   Generic drug:  polyethylene glycol     510 g    Take 17 g by mouth daily.        MULTIVITAMINS PO      Take 1 tablet by mouth daily. Crushed and mixed with applesauce        * order for DME     1 Device    Equipment being ordered: bed rails    Infantile cerebral palsy, unspecified, Unspecified epilepsy without mention of intractable epilepsy, Unspecified intellectual disabilities, Osteoarthrosis, unspecified whether generalized or localized, unspecified site, Essential and other specified forms of tremor, Osteopenia, Spinal stenosis       * order for DME     2 Device    Equipment being ordered: nebulizer mask     Wheezing, SOB (shortness of breath)       * order for DME     1 Box    Equipment being ordered: Interdry Roll    Buttock wound, right, initial encounter, Buttock wound, left, initial encounter       * order for DME     1 Box    Equipment being ordered: Interdry roll.  Apply to buttock crease daily and PRN    Buttock wound, right, initial encounter, Buttock wound, left, initial encounter       * order for DME     1 Box    Equipment being ordered: 4x6 tegaderm  - Apply to buttock daily    Buttock wound, right, initial encounter, Buttock wound, left, initial encounter       * order for DME     1 Box    Equipment being ordered: Nu skin - apply to sacral area daily and prn    Buttock wound, right, initial encounter, Buttock wound, left, initial encounter       * order for DME      coloplast Critic Aid skin paste, thick moisture barrier paste, apply to buttocks twice daily and as needed.        THICK-IT #2 PO      Add to all liquids        tolterodine 1 MG tablet    DETROL    180 tablet    Take 1 tablet by mouth 2 times daily.    Urine incontinence       TYLENOL PM EXTRA STRENGTH  MG tablet   Generic drug:  diphenhydrAMINE-acetaminophen     100    1 TABLET AT BEDTIME AS NEEDED FOR SLEEP    Infantile cerebral palsy, unspecified       * Notice:  This list has 9 medication(s) that are the same as other medications prescribed for you. Read the directions carefully, and ask your doctor or other care provider to review them with you.

## 2017-11-10 NOTE — NURSING NOTE
Injectable Influenza Immunization Documentation    1.  Is the person to be vaccinated sick today?  No    2. Does the person to be vaccinated have an allergy to eggs or to a component of the vaccine?  No    3. Has the person to be vaccinated today ever had a serious reaction to influenza vaccine in the past?  No    4. Has the person to be vaccinated ever had Guillain-[place] syndrome?  No     Form completed by Yesy Fernandes MA  Prior to injection verified patient identity using patient's name and date of birth.   Patient instructed to remain in clinic for 15 minutes afterwards, and to report any adverse reaction to me immediately.

## 2018-02-14 ENCOUNTER — APPOINTMENT (OUTPATIENT)
Dept: GENERAL RADIOLOGY | Facility: CLINIC | Age: 78
DRG: 871 | End: 2018-02-14
Attending: FAMILY MEDICINE
Payer: COMMERCIAL

## 2018-02-14 ENCOUNTER — HOSPITAL ENCOUNTER (INPATIENT)
Facility: CLINIC | Age: 78
LOS: 6 days | Discharge: NURSING, REHAB OR LONG-TERM CARE | DRG: 871 | End: 2018-02-20
Attending: FAMILY MEDICINE | Admitting: FAMILY MEDICINE
Payer: COMMERCIAL

## 2018-02-14 DIAGNOSIS — J69.0 ASPIRATION PNEUMONIA, UNSPECIFIED ASPIRATION PNEUMONIA TYPE, UNSPECIFIED LATERALITY, UNSPECIFIED PART OF LUNG (H): ICD-10-CM

## 2018-02-14 DIAGNOSIS — J44.1 COPD EXACERBATION (H): ICD-10-CM

## 2018-02-14 DIAGNOSIS — E86.0 DEHYDRATION: ICD-10-CM

## 2018-02-14 DIAGNOSIS — E16.2 HYPOGLYCEMIA: ICD-10-CM

## 2018-02-14 DIAGNOSIS — A41.9 SEPSIS, DUE TO UNSPECIFIED ORGANISM: Primary | ICD-10-CM

## 2018-02-14 PROBLEM — J18.9 PNEUMONIA DUE TO INFECTIOUS ORGANISM: Status: ACTIVE | Noted: 2018-02-14

## 2018-02-14 LAB
ALBUMIN SERPL-MCNC: 2.8 G/DL (ref 3.4–5)
ALP SERPL-CCNC: 74 U/L (ref 40–150)
ALT SERPL W P-5'-P-CCNC: 17 U/L (ref 0–50)
ANION GAP SERPL CALCULATED.3IONS-SCNC: 6 MMOL/L (ref 3–14)
AST SERPL W P-5'-P-CCNC: 16 U/L (ref 0–45)
BASE EXCESS BLDV CALC-SCNC: 8.1 MMOL/L
BASOPHILS # BLD AUTO: 0 10E9/L (ref 0–0.2)
BASOPHILS NFR BLD AUTO: 0.2 %
BILIRUB SERPL-MCNC: 0.2 MG/DL (ref 0.2–1.3)
BUN SERPL-MCNC: 13 MG/DL (ref 7–30)
CALCIUM SERPL-MCNC: 8.6 MG/DL (ref 8.5–10.1)
CHLORIDE SERPL-SCNC: 92 MMOL/L (ref 94–109)
CO2 SERPL-SCNC: 33 MMOL/L (ref 20–32)
CREAT SERPL-MCNC: 0.55 MG/DL (ref 0.52–1.04)
DIFFERENTIAL METHOD BLD: NORMAL
EOSINOPHIL # BLD AUTO: 0.1 10E9/L (ref 0–0.7)
EOSINOPHIL NFR BLD AUTO: 0.9 %
ERYTHROCYTE [DISTWIDTH] IN BLOOD BY AUTOMATED COUNT: 14.7 % (ref 10–15)
FLUAV+FLUBV AG SPEC QL: NEGATIVE
FLUAV+FLUBV AG SPEC QL: NEGATIVE
GFR SERPL CREATININE-BSD FRML MDRD: >90 ML/MIN/1.7M2
GLUCOSE BLDC GLUCOMTR-MCNC: 115 MG/DL (ref 70–99)
GLUCOSE SERPL-MCNC: 67 MG/DL (ref 70–99)
HCO3 BLDV-SCNC: 34 MMOL/L (ref 21–28)
HCT VFR BLD AUTO: 40.7 % (ref 35–47)
HGB BLD-MCNC: 13 G/DL (ref 11.7–15.7)
IMM GRANULOCYTES # BLD: 0 10E9/L (ref 0–0.4)
IMM GRANULOCYTES NFR BLD: 0.3 %
LACTATE BLD-SCNC: 0.9 MMOL/L (ref 0.7–2)
LYMPHOCYTES # BLD AUTO: 1.6 10E9/L (ref 0.8–5.3)
LYMPHOCYTES NFR BLD AUTO: 27.8 %
MCH RBC QN AUTO: 30.5 PG (ref 26.5–33)
MCHC RBC AUTO-ENTMCNC: 31.9 G/DL (ref 31.5–36.5)
MCV RBC AUTO: 96 FL (ref 78–100)
MONOCYTES # BLD AUTO: 0.8 10E9/L (ref 0–1.3)
MONOCYTES NFR BLD AUTO: 14.5 %
NEUTROPHILS # BLD AUTO: 3.2 10E9/L (ref 1.6–8.3)
NEUTROPHILS NFR BLD AUTO: 56.3 %
NT-PROBNP SERPL-MCNC: 295 PG/ML (ref 0–1800)
O2/TOTAL GAS SETTING VFR VENT: 32 %
PCO2 BLDV: 54 MM HG (ref 40–50)
PH BLDV: 7.42 PH (ref 7.32–7.43)
PLATELET # BLD AUTO: 206 10E9/L (ref 150–450)
PO2 BLDV: 41 MM HG (ref 25–47)
POTASSIUM SERPL-SCNC: 4.6 MMOL/L (ref 3.4–5.3)
PROT SERPL-MCNC: 7 G/DL (ref 6.8–8.8)
RBC # BLD AUTO: 4.26 10E12/L (ref 3.8–5.2)
SODIUM SERPL-SCNC: 131 MMOL/L (ref 133–144)
SPECIMEN SOURCE: NORMAL
TROPONIN I SERPL-MCNC: <0.015 UG/L (ref 0–0.04)
WBC # BLD AUTO: 5.7 10E9/L (ref 4–11)

## 2018-02-14 PROCEDURE — 87804 INFLUENZA ASSAY W/OPTIC: CPT | Performed by: FAMILY MEDICINE

## 2018-02-14 PROCEDURE — 25000128 H RX IP 250 OP 636: Performed by: FAMILY MEDICINE

## 2018-02-14 PROCEDURE — 94640 AIRWAY INHALATION TREATMENT: CPT

## 2018-02-14 PROCEDURE — 83605 ASSAY OF LACTIC ACID: CPT | Performed by: FAMILY MEDICINE

## 2018-02-14 PROCEDURE — 96375 TX/PRO/DX INJ NEW DRUG ADDON: CPT | Performed by: FAMILY MEDICINE

## 2018-02-14 PROCEDURE — 99285 EMERGENCY DEPT VISIT HI MDM: CPT | Mod: Z6 | Performed by: FAMILY MEDICINE

## 2018-02-14 PROCEDURE — 82803 BLOOD GASES ANY COMBINATION: CPT | Performed by: FAMILY MEDICINE

## 2018-02-14 PROCEDURE — 99285 EMERGENCY DEPT VISIT HI MDM: CPT | Mod: 25 | Performed by: FAMILY MEDICINE

## 2018-02-14 PROCEDURE — 99222 1ST HOSP IP/OBS MODERATE 55: CPT | Mod: AI | Performed by: FAMILY MEDICINE

## 2018-02-14 PROCEDURE — 25800025 ZZH RX 258: Performed by: FAMILY MEDICINE

## 2018-02-14 PROCEDURE — 96365 THER/PROPH/DIAG IV INF INIT: CPT | Performed by: FAMILY MEDICINE

## 2018-02-14 PROCEDURE — 96361 HYDRATE IV INFUSION ADD-ON: CPT | Performed by: FAMILY MEDICINE

## 2018-02-14 PROCEDURE — 87081 CULTURE SCREEN ONLY: CPT | Performed by: FAMILY MEDICINE

## 2018-02-14 PROCEDURE — 71045 X-RAY EXAM CHEST 1 VIEW: CPT | Mod: TC

## 2018-02-14 PROCEDURE — 25000125 ZZHC RX 250: Performed by: FAMILY MEDICINE

## 2018-02-14 PROCEDURE — 25000132 ZZH RX MED GY IP 250 OP 250 PS 637: Performed by: FAMILY MEDICINE

## 2018-02-14 PROCEDURE — 83880 ASSAY OF NATRIURETIC PEPTIDE: CPT | Performed by: FAMILY MEDICINE

## 2018-02-14 PROCEDURE — 93005 ELECTROCARDIOGRAM TRACING: CPT | Performed by: FAMILY MEDICINE

## 2018-02-14 PROCEDURE — 87633 RESP VIRUS 12-25 TARGETS: CPT | Performed by: FAMILY MEDICINE

## 2018-02-14 PROCEDURE — 80053 COMPREHEN METABOLIC PANEL: CPT | Performed by: FAMILY MEDICINE

## 2018-02-14 PROCEDURE — 00000146 ZZHCL STATISTIC GLUCOSE BY METER IP

## 2018-02-14 PROCEDURE — 84484 ASSAY OF TROPONIN QUANT: CPT | Performed by: FAMILY MEDICINE

## 2018-02-14 PROCEDURE — 12000000 ZZH R&B MED SURG/OB

## 2018-02-14 PROCEDURE — 85025 COMPLETE CBC W/AUTO DIFF WBC: CPT | Performed by: FAMILY MEDICINE

## 2018-02-14 PROCEDURE — 93010 ELECTROCARDIOGRAM REPORT: CPT | Mod: Z6 | Performed by: FAMILY MEDICINE

## 2018-02-14 RX ORDER — VANCOMYCIN HYDROCHLORIDE 1 G/200ML
1000 INJECTION, SOLUTION INTRAVENOUS EVERY 12 HOURS
Status: DISCONTINUED | OUTPATIENT
Start: 2018-02-15 | End: 2018-02-16

## 2018-02-14 RX ORDER — NYSTATIN 100000 U/G
1 CREAM TOPICAL
COMMUNITY

## 2018-02-14 RX ORDER — NALOXONE HYDROCHLORIDE 0.4 MG/ML
.1-.4 INJECTION, SOLUTION INTRAMUSCULAR; INTRAVENOUS; SUBCUTANEOUS
Status: DISCONTINUED | OUTPATIENT
Start: 2018-02-14 | End: 2018-02-20 | Stop reason: HOSPADM

## 2018-02-14 RX ORDER — DEXTROSE MONOHYDRATE 25 G/50ML
25-50 INJECTION, SOLUTION INTRAVENOUS
Status: DISCONTINUED | OUTPATIENT
Start: 2018-02-14 | End: 2018-02-20 | Stop reason: HOSPADM

## 2018-02-14 RX ORDER — METHYLPREDNISOLONE SODIUM SUCCINATE 125 MG/2ML
60 INJECTION, POWDER, LYOPHILIZED, FOR SOLUTION INTRAMUSCULAR; INTRAVENOUS EVERY 12 HOURS
Status: DISCONTINUED | OUTPATIENT
Start: 2018-02-15 | End: 2018-02-19

## 2018-02-14 RX ORDER — SODIUM CHLORIDE 9 MG/ML
INJECTION, SOLUTION INTRAVENOUS CONTINUOUS
Status: DISCONTINUED | OUTPATIENT
Start: 2018-02-14 | End: 2018-02-17

## 2018-02-14 RX ORDER — DOCOSANOL 100 MG/G
1 CREAM TOPICAL
COMMUNITY

## 2018-02-14 RX ORDER — ONDANSETRON 2 MG/ML
4 INJECTION INTRAMUSCULAR; INTRAVENOUS EVERY 6 HOURS PRN
Status: DISCONTINUED | OUTPATIENT
Start: 2018-02-14 | End: 2018-02-20 | Stop reason: HOSPADM

## 2018-02-14 RX ORDER — VANCOMYCIN HYDROCHLORIDE 1 G/200ML
1000 INJECTION, SOLUTION INTRAVENOUS EVERY 24 HOURS
Status: DISCONTINUED | OUTPATIENT
Start: 2018-02-14 | End: 2018-02-14

## 2018-02-14 RX ORDER — DIVALPROEX SODIUM 250 MG/1
250 TABLET, DELAYED RELEASE ORAL EVERY 8 HOURS SCHEDULED
Status: DISCONTINUED | OUTPATIENT
Start: 2018-02-14 | End: 2018-02-15

## 2018-02-14 RX ORDER — METHYLPREDNISOLONE SODIUM SUCCINATE 125 MG/2ML
125 INJECTION, POWDER, LYOPHILIZED, FOR SOLUTION INTRAMUSCULAR; INTRAVENOUS ONCE
Status: COMPLETED | OUTPATIENT
Start: 2018-02-14 | End: 2018-02-14

## 2018-02-14 RX ORDER — ACETAMINOPHEN 650 MG/1
650 SUPPOSITORY RECTAL ONCE
Status: COMPLETED | OUTPATIENT
Start: 2018-02-14 | End: 2018-02-14

## 2018-02-14 RX ORDER — IPRATROPIUM BROMIDE AND ALBUTEROL SULFATE 2.5; .5 MG/3ML; MG/3ML
3 SOLUTION RESPIRATORY (INHALATION)
Status: DISCONTINUED | OUTPATIENT
Start: 2018-02-14 | End: 2018-02-14

## 2018-02-14 RX ORDER — IPRATROPIUM BROMIDE AND ALBUTEROL SULFATE 2.5; .5 MG/3ML; MG/3ML
3 SOLUTION RESPIRATORY (INHALATION) EVERY 6 HOURS
Status: DISCONTINUED | OUTPATIENT
Start: 2018-02-15 | End: 2018-02-20 | Stop reason: HOSPADM

## 2018-02-14 RX ORDER — DEXTROSE MONOHYDRATE 25 G/50ML
25 INJECTION, SOLUTION INTRAVENOUS ONCE
Status: COMPLETED | OUTPATIENT
Start: 2018-02-14 | End: 2018-02-14

## 2018-02-14 RX ORDER — ACETAMINOPHEN 325 MG/1
650 TABLET ORAL EVERY 4 HOURS PRN
Status: DISCONTINUED | OUTPATIENT
Start: 2018-02-14 | End: 2018-02-20 | Stop reason: HOSPADM

## 2018-02-14 RX ORDER — ACETAMINOPHEN 325 MG/1
325-650 TABLET ORAL
COMMUNITY

## 2018-02-14 RX ORDER — ALBUTEROL SULFATE 0.83 MG/ML
2.5 SOLUTION RESPIRATORY (INHALATION) EVERY 4 HOURS PRN
Status: DISCONTINUED | OUTPATIENT
Start: 2018-02-14 | End: 2018-02-14

## 2018-02-14 RX ORDER — DEXTROSE MONOHYDRATE 25 G/50ML
INJECTION, SOLUTION INTRAVENOUS
Status: DISCONTINUED
Start: 2018-02-14 | End: 2018-02-14 | Stop reason: HOSPADM

## 2018-02-14 RX ORDER — SODIUM CHLORIDE 9 MG/ML
1000 INJECTION, SOLUTION INTRAVENOUS CONTINUOUS
Status: DISCONTINUED | OUTPATIENT
Start: 2018-02-14 | End: 2018-02-14

## 2018-02-14 RX ORDER — ALBUTEROL SULFATE 0.83 MG/ML
2.5 SOLUTION RESPIRATORY (INHALATION)
Status: DISCONTINUED | OUTPATIENT
Start: 2018-02-14 | End: 2018-02-20 | Stop reason: HOSPADM

## 2018-02-14 RX ORDER — ONDANSETRON 4 MG/1
4 TABLET, ORALLY DISINTEGRATING ORAL EVERY 6 HOURS PRN
Status: DISCONTINUED | OUTPATIENT
Start: 2018-02-14 | End: 2018-02-20 | Stop reason: HOSPADM

## 2018-02-14 RX ORDER — ACETAMINOPHEN 650 MG/1
650 SUPPOSITORY RECTAL EVERY 4 HOURS PRN
Status: DISCONTINUED | OUTPATIENT
Start: 2018-02-14 | End: 2018-02-20 | Stop reason: HOSPADM

## 2018-02-14 RX ORDER — LORATADINE 10 MG/1
TABLET ORAL
COMMUNITY

## 2018-02-14 RX ORDER — NICOTINE POLACRILEX 4 MG
15-30 LOZENGE BUCCAL
Status: DISCONTINUED | OUTPATIENT
Start: 2018-02-14 | End: 2018-02-20 | Stop reason: HOSPADM

## 2018-02-14 RX ADMIN — ACETAMINOPHEN 650 MG: 650 SUPPOSITORY RECTAL at 22:40

## 2018-02-14 RX ADMIN — TAZOBACTAM SODIUM AND PIPERACILLIN SODIUM 3.38 G: 375; 3 INJECTION, SOLUTION INTRAVENOUS at 21:58

## 2018-02-14 RX ADMIN — IPRATROPIUM BROMIDE AND ALBUTEROL SULFATE 3 ML: .5; 3 SOLUTION RESPIRATORY (INHALATION) at 19:37

## 2018-02-14 RX ADMIN — ACETAMINOPHEN 650 MG: 325 SUPPOSITORY RECTAL at 19:47

## 2018-02-14 RX ADMIN — IPRATROPIUM BROMIDE AND ALBUTEROL SULFATE 3 ML: .5; 3 SOLUTION RESPIRATORY (INHALATION) at 20:06

## 2018-02-14 RX ADMIN — SODIUM CHLORIDE: 9 INJECTION, SOLUTION INTRAVENOUS at 21:58

## 2018-02-14 RX ADMIN — VANCOMYCIN HYDROCHLORIDE 1000 MG: 1 INJECTION, SOLUTION INTRAVENOUS at 20:02

## 2018-02-14 RX ADMIN — SODIUM CHLORIDE 1000 ML: 9 INJECTION, SOLUTION INTRAVENOUS at 18:47

## 2018-02-14 RX ADMIN — DEXTROSE MONOHYDRATE 25 ML: 500 INJECTION PARENTERAL at 18:49

## 2018-02-14 RX ADMIN — SODIUM CHLORIDE 1000 ML: 9 INJECTION, SOLUTION INTRAVENOUS at 19:51

## 2018-02-14 RX ADMIN — IPRATROPIUM BROMIDE AND ALBUTEROL SULFATE 3 ML: .5; 3 SOLUTION RESPIRATORY (INHALATION) at 17:37

## 2018-02-14 RX ADMIN — METHYLPREDNISOLONE SODIUM SUCCINATE 125 MG: 125 INJECTION, POWDER, FOR SOLUTION INTRAMUSCULAR; INTRAVENOUS at 19:47

## 2018-02-14 ASSESSMENT — ACTIVITIES OF DAILY LIVING (ADL)
COMMUNICATION: 3 - UNABLE TO UNDERSTAND OR SPEAK (NOT RELATED TO LANGUAGE BARRIER)
DRESS: 4 - COMPLETELY DEPENDENT
TRANSFERRING: 4 - COMPLETELY DEPENDENT
EATING: 4 - COMPLETELY DEPENDENT
TOILETING: 4 - COMPLETELY DEPENDENT
BATHING: 4 - COMPLETELY DEPENDENT
AMBULATION: 4 - COMPLETELY DEPENDENT
SWALLOWING: 2 - DIFFICULTY SWALLOWING LIQUIDS/FOODS

## 2018-02-14 NOTE — IP AVS SNAPSHOT
Mickie Landin #[id number] (CSN: [id number])  (78 year old F)  (Adm: 18)     [id number]               [address] MEDICAL SURGICAL: [phone]            Patient Demographics     Patient Name Sex          Age SSN Address Phone    Mickie Landin Female 1940 (78 year old) [id number]  [facility] [address] [phone] (Home)      Emergency Contact(s)     Name Relation Home Work Mobile    ERIN PARAMR Other   [phone]    Jeff Ch Other  [phone]     MELISSA GUADALUPE Other [phone]      Aleta García (guardian) Other [phone]        Admission Information     Attending Provider Admitting Provider Admission Type Admission Date/Time    Adelina Miller MD Peterson, Elizabeth Joanne, MD Emergency 18  1706    Discharge Date Hospital Service Auth/Cert Status Service Area     Hospitalist Incomplete [facility]    Unit Room/Bed Admission Status       [facility] MEDICAL SURGICAL  Admission (Confirmed)       Admission     Complaint    Aspiration pneumonia (H)      Hospital Account     Name Acct ID Class Status Primary Coverage    Mickie Landin [id number] Inpatient Open MEDICA - [employer] [place] [facility] [place]            Guarantor Account (for Hospital Account #[id number])     Name Relation to Pt Service Area Active? Acct Type    Mickie Landin  FCS Yes Personal/Family    Address Phone          [phone] [place] [address] [phone](H)              Coverage Information (for Hospital Account #[id number])     F/O Payor/Plan Precert #    MEDICA/MEDICA [employer]UTIONS [place] [facility] [place]FV [place]     Subscriber Subscriber #    Mickie Landin [id number]    Address Phone    [address] [address]  [place] [address] [phone]                                                INTERAGENCY TRANSFER FORM - PHYSICIAN ORDERS   2018                       [address]  "MEDICAL SURGICAL: [phone]            Attending Provider: Adelina Miller MD     Allergies:  Baclofen    Infection:  None   Service:  HOSPITALIST    Ht:  1.626 m (5' 4\")   Wt:  53.5 kg (117 lb 15.1 oz)   Admission Wt:  53.5 kg (117 lb 15.1 oz)    BMI:  20.25 kg/m 2   BSA:  1.55 m 2            ED Clinical Impression     Diagnosis Description Comment Added By Time Added    Aspiration pneumonia, unspecified aspiration pneumonia type, unspecified laterality, unspecified part of lung (H) [J69.0] Aspiration pneumonia, unspecified aspiration pneumonia type, unspecified laterality, unspecified part of lung (H) [J69.0]  Casey Kothari MD 2/14/2018  7:49 PM    COPD exacerbation (H) [J44.1] COPD exacerbation (H) [J44.1]  Casey Kothari MD 2/14/2018  7:49 PM    Dehydration [E86.0] Dehydration [E86.0]  Casey Kothari MD 2/14/2018  7:49 PM    Hypoglycemia [E16.2] Hypoglycemia [E16.2]  Casey Kothari MD 2/14/2018  7:49 PM      Hospital Problems as of 2/20/2018              Priority Class Noted POA    Infantile cerebral palsy (H) Medium  Unknown Yes    Epilepsy (H) Medium  Unknown Yes    Mental retardation Medium  Unknown Yes    Esophageal reflux Medium  10/31/2001 Yes    Dysphagia Medium  10/2/2011 Yes    * (Principal)Pneumonia due to infectious organism Medium  2/14/2018 Yes    Sepsis (H) - fever, tachypnea, tachycardia Medium  2/15/2018 Yes    Acute respiratory failure with hypoxia (H) Medium  2/15/2018 Yes      Non-Hospital Problems as of 2/20/2018              Priority Class Noted    Osteoarthritis Medium  Unknown    Diaphragmatic hernia Medium  Unknown    Essential and other specified forms of tremor Medium  Unknown    Idiopathic interstitial pneumonia (H) Medium  10/31/2001    History of colon cancer Medium  2/4/2010    Osteopenia Medium  9/13/2010    CARDIOVASCULAR SCREENING; LDL GOAL LESS THAN 130 Medium  10/31/2010    Chronic rhinitis Medium  2/8/2011    Chronic " constipation Medium  10/2/2011    Advance Care Planning Medium  10/11/2011    Insomnia Medium  2/24/2012    Urine incontinence Medium  2/24/2012    Psychogenic cough Medium  4/26/2012    Fibrosis of lung (H) Medium  5/24/2012    Recurrent UTI Medium  5/24/2012    Weight loss Medium  6/1/2012    Spinal stenosis Medium  9/24/2013    Health Care Home Medium  11/25/2014      Code Status History     Date Active Date Inactive Code Status Order ID Comments User Context    6/4/2012  8:30 AM 2/14/2018 11:09 PM DNR/DNI [id number]  [facility]      Current Code Status     Date Active Code Status Order ID Comments User Context       2/14/2018 11:09 PM DNR/DNI [id number]  Nicholas Fuller MD Inpatient       Summary of Visit     Reason for your hospital stay       Pneumonia and suspected COPD exacerbation which has improved with the steroids, antibiotics, and nebs given during this hospital stay.  Mickie will continue with the antibiotics (Augmentin and Bactrim), steroids (prednisone) and nebs (scheduled Duonebs and as needed albuterol nebs) as she returns to the group home.                Medication Review      START taking        Dose / Directions Comments    amoxicillin-clavulanate 875-125 MG per tablet   Commonly known as:  AUGMENTIN   Indication:  Pneumonia caused by Inhaling a Substance Into the Lungs, Healthcare-Associated Pneumonia   Used for:  Aspiration pneumonia, unspecified aspiration pneumonia type, unspecified laterality, unspecified part of lung (H)        Dose:  1 tablet   Take 1 tablet by mouth every 12 hours for 7 doses   Quantity:  7 tablet   Refills:  0        ipratropium - albuterol 0.5 mg/2.5 mg/3 mL 0.5-2.5 (3) MG/3ML neb solution   Commonly known as:  DUONEB   Used for:  COPD exacerbation (H)        Dose:  3 mL   Take 1 vial (3 mLs) by nebulization every 6 hours   Quantity:  360 mL   Refills:  0        predniSONE 20 MG tablet   Commonly known as:  DELTASONE   Used for:  COPD exacerbation (H)         Dose:  40 mg   Start taking on:  2/21/2018   Take 2 tablets (40 mg) by mouth daily x4 days, then 1 tablet daily x4 days, then 0.5 tab daily x4 days, then stop   Quantity:  14 tablet   Refills:  0        sulfamethoxazole-trimethoprim 800-160 MG per tablet   Commonly known as:  BACTRIM DS/SEPTRA DS   Indication:  Pneumonia caused by Inhaling a Substance Into the Lungs, Healthcare-Associated Pneumonia        Dose:  1 tablet   Take 1 tablet by mouth 2 times daily for 7 doses   Quantity:  7 tablet   Refills:  0          CONTINUE these medications which have NOT CHANGED        Dose / Directions Comments    acetaminophen 325 MG tablet   Commonly known as:  TYLENOL        Dose:  325-650 mg   Take 325-650 mg by mouth   Refills:  0        albuterol (2.5 MG/3ML) 0.083% neb solution   Used for:  Wheezing        Dose:  1 ampule   Take 3 mLs (2.5 mg) by nebulization every 6 hours as needed for shortness of breath / dyspnea   Quantity:  30 vial   Refills:  0        BIOTENE PBF DRY MOUTH Pste        use twice daily   Refills:  0        bismuth subsalicylate 262 MG/15ML suspension   Commonly known as:  PEPTO BISMOL        Dose:  15-30 mL   Take 15-30 mLs by mouth   Refills:  0        BUTT PASTE - REGULAR   Commonly known as:  DR VIANEY ELLINGTON BUTT PASTE FORMULA   Used for:  Open wound of sacroiliac region without complication        Apply topically 2 times daily Apply to buttocks wound BID and prn.   Quantity:  16 oz   Refills:  1        DEPAKOTE SPRINKLES 125 MG capsule   Generic drug:  divalproex        2 CAPSULES three times DAILY   Refills:  0        docosanol 10 % Crea cream   Commonly known as:  ABREVA        Dose:  1 Application   Apply 1 Application topically   Refills:  0        EAR DROPS 6.5 % otic solution   Generic drug:  carbamide peroxide        Dose:  5-10 drop   Place 5-10 drops in ear(s)   Refills:  0        loratadine 10 MG tablet   Commonly known as:  CLARITIN        Refills:  0        MELATONIN PO         Refills:  0        MIRALAX powder   Generic drug:  polyethylene glycol        Dose:  1 capful   Take 17 g by mouth daily.   Quantity:  510 g   Refills:  1        nystatin cream   Commonly known as:  MYCOSTATIN        Dose:  1 Application   Apply 1 Application topically   Refills:  0        * order for DME   Used for:  Infantile cerebral palsy, unspecified, Unspecified epilepsy without mention of intractable epilepsy, Unspecified intellectual disabilities, Osteoarthrosis, unspecified whether generalized or localized, unspecified site, Essential and other specified forms of tremor, Osteopenia, Spinal stenosis        Equipment being ordered: bed rails   Quantity:  1 Device   Refills:  0        * order for DME   Used for:  Wheezing, SOB (shortness of breath)        Equipment being ordered: nebulizer mask   Quantity:  2 Device   Refills:  1        * order for DME   Used for:  Buttock wound, right, initial encounter, Buttock wound, left, initial encounter        Equipment being ordered: Interdry Roll   Quantity:  1 Box   Refills:  3        * order for DME   Used for:  Buttock wound, right, initial encounter, Buttock wound, left, initial encounter        Equipment being ordered: Interdry roll.  Apply to buttock crease daily and PRN   Quantity:  1 Box   Refills:  3        * order for DME   Used for:  Buttock wound, right, initial encounter, Buttock wound, left, initial encounter        Equipment being ordered: 4x6 tegaderm  - Apply to buttock daily   Quantity:  1 Box   Refills:  3        * order for DME   Used for:  Buttock wound, right, initial encounter, Buttock wound, left, initial encounter        Equipment being ordered: Nu skin - apply to sacral area daily and prn   Quantity:  1 Box   Refills:  3        * order for DME        coloplast Critic Aid skin paste, thick moisture barrier paste, apply to buttocks twice daily and as needed.   Refills:  0        RA CALAMINE 6.971-6.971 % Lotn        Dose:  1 Application   Apply  "1 Application topically   Refills:  0        THICK-IT #2 PO        Add to all liquids   Refills:  0        * Notice:  This list has 7 medication(s) that are the same as other medications prescribed for you. Read the directions carefully, and ask your doctor or other care provider to review them with you.            After Care     Activity       Your activity upon discharge: activity as tolerated       Diet       Follow this diet upon discharge: Dysphagia Diet Level 1: Pureed; Pudding/Spoon Thick Liquids (use instant food thickener)             Follow-Up Appointment Instructions     Follow-up and recommended labs and tests        Follow up with primary care provider, Indira Bobo, within 7 days for hospital follow- up.             Statement of Approval     Ordered          02/20/18 1028  I have reviewed and agree with all the recommendations and orders detailed in this document.  EFFECTIVE NOW     Approved and electronically signed by:  Adelina Miller MD                                                 INTERAGENCY TRANSFER FORM - NURSING   2/14/2018                       [address] SURGICAL: [phone]            Attending Provider: Adelina Miller MD     Allergies:  Baclofen    Infection:  None   Service:  HOSPITALIST    Ht:  1.626 m (5' 4\")   Wt:  53.5 kg (117 lb 15.1 oz)   Admission Wt:  53.5 kg (117 lb 15.1 oz)    BMI:  20.25 kg/m 2   BSA:  1.55 m 2            Advance Directives        Scanned docmt in ACP Activity?           Yes, scanned ACP docmt        Immunizations     Name Date      HepB 11/18/86     Historical DTP/aP 02/18/99     Historical DTP/aP 06/13/97     Historical DTP/aP 02/28/89     Influenza (H1N1) 02/04/10     Influenza (High Dose) 3 valent vaccine 11/10/16     Influenza (High Dose) 3 valent vaccine 10/04/13     Influenza (High Dose) 3 valent vaccine 11/08/12     Influenza (High Dose) 3 valent vaccine 10/11/11     Influenza (High Dose) 3 valent " vaccine 10/15/10     Influenza (IIV3) PF 10/15/10     Influenza (IIV3) PF 09/10/09     Influenza (IIV3) PF 11/11/08     Influenza (IIV3) PF 11/07/06     Influenza (IIV3) PF 11/07/05     Influenza (IIV3) PF 10/26/04     Influenza (IIV3) PF 10/27/03     Influenza (IIV3) PF 10/28/02     Influenza (IIV3) PF 11/08/01     Influenza (IIV3) PF 11/19/00     Influenza (IIV3) PF 11/16/99     Influenza (IIV3) PF 11/17/98     Influenza (IIV3) PF 11/14/97     Influenza (IIV3) PF 11/26/96     Influenza (IIV3) PF 10/25/95     Influenza (IIV3) PF 11/01/93     Influenza (IIV3) PF 10/16/92     Influenza (IIV3) PF 10/15/86     Influenza (IIV3) PF 10/17/84     Influenza Vaccine IM 3yrs+ 4 Valent IIV4 11/10/17     MMR 10/11/84     Mantoux Tuberculin Skin Test 04/15/09     Mantoux Tuberculin Skin Test 07/15/08     Mantoux Tuberculin Skin Test 03/23/06     Mantoux Tuberculin Skin Test 03/12/04     Mantoux Tuberculin Skin Test 02/12/02     Mantoux Tuberculin Skin Test 02/20/01     Mantoux Tuberculin Skin Test 02/18/99     OPV, trivalent, live 03/27/63     OPV, trivalent, live 02/14/63     OPV, trivalent, live 12/08/62     Pneumococcal 23 valent 02/22/08     TD (ADULT, 7+) 02/16/09     Tetanus 02/11/73       ASSESSMENT     Discharge Profile Flowsheet     EXPECTED DISCHARGE     Last Bowel Movement  02/19/18 02/19/18 1722    Expected Discharge Date  02/17/18 02/15/18 1121   GI Signs/Symptoms  fecal incontinence 02/20/18 1128    DISCHARGE NEEDS ASSESSMENT     Passing flatus  yes 02/20/18 1128    Concerns To Be Addressed  other (see comments) 02/18/18 1949   COMMUNICATION ASSESSMENT      Concerns Comments  will return to group home upon discharge 02/19/18 8901   Patient's communication style  spoken language (English or Bilingual) 02/14/18 2110    Patient/family verbalizes understanding of discharge plan recommendations?  Yes 02/15/18 1122   Patient's primary language  English 02/14/18 2110    Medical Team notified of plan?  yes 02/15/18 1122    " services offered to the patient? (Install  services phone or TTY, if applicable)  yes 02/14/18 2110    Transportation Available  van, wheelchair accessible 02/15/18 1122   SKIN      FUNCTIONAL LEVEL CURRENT     Inspection of bony prominences  Full 02/20/18 1134    Ambulation  4 - completely dependent 02/19/18 1722   Inspection under devices  Full except (identify device(s) not inspected) 02/20/18 1134    Transferring  4 - completely dependent 02/19/18 1722   Skin WDL  ex 02/20/18 1134    Toileting  4 - completely dependent 02/19/18 1722   Skin Color/Characteristics  flushed;redness blanchable 02/20/18 1134    Bathing  4 - completely dependent 02/19/18 1722   Skin Temperature  warm 02/20/18 1134    Dressing  4 - completely dependent 02/19/18 1722   Skin Moisture  dry 02/20/18 1134    Eating  2 - assistive person 02/19/18 1722   Skin Elasticity  quick return to original state 02/20/18 0105    Communication  3 - unable to speak (not related to language barrier) 02/19/18 1722   Skin Integrity  rash(es) (around mouth) 02/20/18 1134    Swallowing  2 - difficulty swallowing liquids/foods 02/19/18 1722   SAFETY      GASTROINTESTINAL (ADULT,PEDIATRIC,OB)     Safety WDL  WDL 02/20/18 1134    GI WDL  WDL 02/20/18 1134   All Alarms  alarm(s) activated and audible 02/20/18 1133                 Assessment WDL (Within Defined Limits) Definitions           Safety WDL     Effective: 09/28/15    Row Information: <b>WDL Definition:</b> Bed in low position, wheels locked; call light in reach; upper side rails up x 2; ID band on<br> <font color=\"gray\"><i>Item=AS safety wdl>>List=AS safety wdl>>Version=F14</i></font>      Skin WDL     Effective: 09/28/15    Row Information: <b>WDL Definition:</b> Warm; dry; intact; elastic; without discoloration; pressure points without redness<br> <font color=\"gray\"><i>Item=AS skin wdl>>List=AS skin wdl>>Version=F14</i></font>      Vitals     Vital Signs Flowsheet     VITAL SIGNS  " "   Presence of Pain  No nonverbal indicators of pain present 02/19/18 1715    Temp  99.4  F (37.4  C) 02/20/18 1136   Assessment Indicator  PRN assessment 02/16/18 0606    Temp src  Oral 02/20/18 1136   HEIGHT AND WEIGHT      Resp  20 02/20/18 1136   Height  1.626 m (5' 4\") 02/14/18 2212    Pulse  89 02/20/18 1136   Height Method  Actual 02/14/18 2212    Pulse/Heart Rate Source  Monitor 02/20/18 1136   Weight  53.5 kg (117 lb 15.1 oz) 02/14/18 2147    BP  155/69 02/20/18 1136   Weight Method  Bed scale 02/14/18 2147    BP Location  Right arm 02/20/18 0732   BSA (Calculated - sq m)  1.55 02/14/18 2212    OXYGEN THERAPY     BMI (Calculated)  20.29 02/14/18 2212    SpO2  90 % 02/20/18 1136   CLINICALLY ALIGNED PAIN ASSESSMENT (CAPA) ([facility], [facility] AND [facility] ADULTS ONLY)      O2 Device  None (Room air) 02/20/18 1136   Comfort  -- (Pt non verbal. Not grabbing at tubings at this time) 02/14/18 2049    Oxygen Delivery  1 LPM 02/20/18 0918   DAILY CARE      PAIN/COMFORT     Activity Management  activity adjusted per tolerance 02/20/18 1133    Patient Currently in Pain  no 02/20/18 1124   Activity Assistance Provided  assistance, 2 people 02/20/18 1133    Preferred Pain Scale  Adult Non-Verbal ([facility] Only) 02/20/18 0305   Additional Documentation  Activity Device Assistance (Row) 02/17/18 0200    0-10 Pain Scale  0 (FLACC) 02/14/18 1706   Assistive Device Utilized  mechanical lift 02/20/18 1133    PAINAD Breathing  0-->normal 02/20/18 1124   POSITIONING      PAINAD Negative Vocalization  0-->none 02/20/18 1124   Body Position  lower extremity elevated, right;side-lying, right 02/20/18 1133    PAINAD Facial Expression  0-->smiling or inexpressive 02/20/18 1124   Head of Bed (HOB)  HOB at 30-45 degrees 02/20/18 1133    PAINAD Body Language  0-->relaxed 02/20/18 1124   Positioning/Transfer Devices  in use;pillows 02/19/18 1715    PAINAD Consolability  0-->no need to console 02/20/18 1124   Chair  Upright in chair 02/19/18 1044 "    PAINAD Score  0 02/20/18 1124   ECG      PAIN ASSESSMENT/NONVERBAL ICU (ADULT)     ECG Rhythm  Sinus rhythm 02/15/18 0851            Patient Lines/Drains/Airways Status    Active LINES/DRAINS/AIRWAYS     None            Patient Lines/Drains/Airways Status    Active PICC/CVC     None            Intake/Output Detail Report     Date Intake     Output Net    Shift P.O. I.V. IV Piggyback Total Urine Total       Noc 02/18/18 2300 - 02/19/18 0659 140 -- -- 140 -- -- 140    Day 02/19/18 0700 - 02/19/18 1876 020 6444 -- 1418 -- -- 1418    Anita 02/19/18 1500 - 02/19/18 2259 200 683 -- 883 -- -- 883    Noc 02/19/18 2300 - 02/20/18 0659 -- -- -- -- -- -- 0    Day 02/20/18 0700 - 02/20/18 1459 360 -- -- 360 -- -- 360      Last Void/BM       Most Recent Value    Urine Occurrence 1 at 02/20/2018 1102    Stool Occurrence 1 at 02/20/2018 1102      Case Management/Discharge Planning     Case Management/Discharge Planning Flowsheet     REFERRAL INFORMATION     Support Assessment  Adequate family and caregiver support 02/15/18 1121    Did the Initial Social Work Assessment result in a Social Work Case?  Yes 02/15/18 1118   COPING/STRESS      Admission Type  inpatient 02/15/18 1118   Major Change/Loss/Stressor  none 02/14/18 2302    Arrived From  other (see comments) ([facility]) 02/15/18 1119   EXPECTED DISCHARGE      Referral Source  interdisciplinary rounds 02/15/18 1119   Expected Discharge Date  02/17/18 02/15/18 1121    Reason For Consult  discharge planning 02/15/18 1119   ASSESSMENT/CONCERNS TO BE ADDRESSED      CTS Assigned to Case  Latisha  02/15/18 1119   Concerns To Be Addressed  other (see comments) 02/18/18 1949    Primary Care MD Name  Funmi  02/15/18 1119   Concerns Comments  will return to group home upon discharge 02/19/18 2254    LIVING ENVIRONMENT     DISCHARGE PLANNING      Lives With  facility resident 02/15/18 1119   Patient/family verbalizes understanding of discharge plan recommendations?  Yes 02/15/18  1122    Living Arrangements  residential facility 02/15/18 1121   Medical Team notified of plan?  yes 02/15/18 1122    Quality Of Family Relationships  supportive;involved 02/15/18 1121   Transportation Available  van, wheelchair accessible 02/15/18 1122    Able to Return to Prior Living Arrangements  yes 02/15/18 1121   ABUSE RISK SCREEN      HOME SAFETY     QUESTION TO PATIENT:  Has a member of your family or a partner(now or in the past) intimidated, hurt, manipulated, or controlled you in any way?  patient declined to answer or is unable to answer 02/14/18 2110    Patient Feels Safe Living in Home?  other (see comments) (patient is unable to answer) 02/15/18 1337   QUESTION TO PATIENT: Do you feel safe going back to the place where you are living?  patient declined to answer or is unable to answer 02/14/18 2110    ASSESSMENT OF FAMILY/SOCIAL SUPPORT     OBSERVATION: Is there reason to believe there has been maltreatment of a vulnerable adult (ie. Physical/Sexual/Emotional abuse, self neglect, lack of adequate food, shelter, medical care, or financial exploitation)?  no 02/14/18 2110    Marital Status  Single 02/15/18 1121   OTHER      Who is your support system?  Facility resident(s)/Staff 02/15/18 1121   Are you depressed or being treated for depression?  No (ASHOK/ nonverbal) 02/14/18 2301    Description of Support System  Supportive;Involved 02/15/18 1121                       [address] MEDICAL SURGICAL: [phone]            Medication Administration Report for Mickie Landin as of 02/20/18 3709   Legend:    Given Hold Not Given Due Canceled Entry Other Actions    Time Time (Time) Time  Time-Action       Inactive    Active    Linked        Medications 02/14/18 02/15/18 02/16/18 02/17/18 02/18/18 02/19/18 02/20/18    acetaminophen (TYLENOL) Suppository 650 mg  Dose: 650 mg  Freq: EVERY 4 HOURS PRN Route: RE  PRN Reason: mild pain  Start: 02/14/18 2100   Admin Instructions: Alternate ibuprofen (if  ordered) with acetaminophen.  Maximum acetaminophen dose from all sources = 75 mg/kg/day not to exceed 4 grams/day.     2240 (650 mg)-Given         2124 (650 mg)-Given         1137 (650 mg)-Given        0933 (650 mg)-Given            acetaminophen (TYLENOL) tablet 650 mg  Dose: 650 mg  Freq: EVERY 4 HOURS PRN Route: PO  PRN Reasons: mild pain,fever  Start: 02/14/18 2305   Admin Instructions: Maximum acetaminophen dose from all sources = 75 mg/kg/day not to exceed 4 grams/day.               albuterol neb solution 2.5 mg  Dose: 2.5 mg  Freq: EVERY 2 HOURS PRN Route: NEBULIZATION  PRN Reason: wheezing  Start: 02/14/18 2303     0000 (2.5 mg)-Given        0108 (2.5 mg)-Given       0517 (2.5 mg)-Given               amoxicillin-clavulanate (AUGMENTIN) 875-125 MG per tablet 1 tablet  Dose: 1 tablet  Freq: EVERY 12 HOURS SCHEDULED Route: PO  Indications of Use: ASPIRATION PNEUMONIA,HEALTHCARE-ASSOCIATED PNEUMONIA  Start: 02/19/18 0845   Admin Instructions: Crush and given in food          0932 (1 tablet)-Given       2018 (1 tablet)-Given        0909 (1 tablet)-Given       [ ] 2000           glucose 40 % gel 15-30 g  Dose: 15-30 g  Freq: EVERY 15 MIN PRN Route: PO  PRN Reason: low blood sugar  Start: 02/14/18 2307   Admin Instructions: Give 15 g for BG 51 to 69 mg/dL IF patient is conscious and able to swallow. Give 30 g for BG less than or equal to 50 mg/dL IF patient is conscious and able to swallow. Do NOT give glucose gel via enteral tube.  IF patient has enteral tube: give apple juice 120 mL (4 oz or 15 g of CHO) via enteral tube for BG 51 to 69 mg/dL.  Give apple juice 240 mL (8 oz or 30 g of CHO) via enteral tube for BG less than or equal to 50 mg/dL.    ~Oral gel is preferable for conscious and able to swallow patient.   ~IF gel unavailable or patient refuses may provide apple juice 120 mL (4 oz or 15 g of CHO). Document juice on I and O flowsheet.              Or  dextrose 50 % injection 25-50 mL  Dose: 25-50  mL  Freq: EVERY 15 MIN PRN Route: IV  PRN Reason: low blood sugar  Start: 02/14/18 2307   Admin Instructions: Use if have IV access, BG less than 70 mg/dL and meet dose criteria below:  Dose if conscious and alert (or disorientated) and NPO = 25 mL  Dose if unconscious / not alert = 50 mL  Vesicant. For ordered doses up to 25 mg, give IV Push undiluted. Give each 5g over 1 minute.              Or  glucagon injection 1 mg  Dose: 1 mg  Freq: EVERY 15 MIN PRN Route: SC  PRN Reason: low blood sugar  PRN Comment: May repeat x 1 only  Start: 02/14/18 2307   Admin Instructions: May give SQ or IM. ONLY use glucagon IF patient has NO IV access AND is UNABLE to swallow AND blood glucose is LESS than or EQUAL to 50 mg/dL.  Give IV Push over 1 minute. Reconstitute with 1mL sterile water.               ipratropium - albuterol 0.5 mg/2.5 mg/3 mL (DUONEB) neb solution 3 mL  Dose: 3 mL  Freq: EVERY 6 HOURS Route: NEBULIZATION  Start: 02/15/18 0300     0354 (3 mL)-Given       0909 (3 mL)-Given       1443 (3 mL)-Given       2035 (3 mL)-Given        0346 (3 mL)-Given       (0826)-Not Given [C]       1349 (3 mL)-Given              2041 (3 mL)-Given        0307 (3 mL)-Given       0925 (3 mL)-Given       1539 (3 mL)-Given       2122 (3 mL)-Given        0400 (3 mL)-Given       0800 (3 mL)-Given       1548 (3 mL)-Given       2127 (3 mL)-Given        0247 (3 mL)-Given       0958 (3 mL)-Given       (1400)-Not Given [C]       2025 (3 mL)-Given        0254 (3 mL)-Given       1130 (3 mL)-Given       [ ] 1600       [ ] 2200           naloxone (NARCAN) injection 0.1-0.4 mg  Dose: 0.1-0.4 mg  Freq: EVERY 2 MIN PRN Route: IV  PRN Reason: opioid reversal  Start: 02/14/18 2100   Admin Instructions: For respiratory rate LESS than or EQUAL to 8.  Partial reversal dose:  0.1 mg titrated q 2 minutes for Analgesia Side Effects Monitoring Sedation Level of 3 (frequently drowsy, arousable, drifts to sleep during conversation).Full reversal dose:  0.4 mg  bolus for Analgesia Side Effects Monitoring Sedation Level of 4 (somnolent, minimal or no response to stimulation).  For ordered doses up to 2mg give IVP. Give each 0.4mg over 15 seconds in emergency situations. For non-emergent situations further dilute in 9mL of NS to facilitate titration of response.               ondansetron (ZOFRAN-ODT) ODT tab 4 mg  Dose: 4 mg  Freq: EVERY 6 HOURS PRN Route: PO  PRN Reasons: nausea,vomiting  Start: 02/14/18 2100   Admin Instructions: This is Step 1 of nausea and vomiting management.  If nausea not resolved in 15 minutes, go to Step 2 prochlorperazine (COMPAZINE). Do not push through foil backing. Peel back foil and gently remove. Place on tongue immediately. Administration with liquid unnecessary  With dry hands, peel back foil backing and gently remove tablet; do not push oral disintegrating tablet through foil backing; administer immediately on tongue and oral disintegrating tablet dissolves in seconds; then swallow with saliva; liquid not required.                     Or  ondansetron (ZOFRAN) injection 4 mg  Dose: 4 mg  Freq: EVERY 6 HOURS PRN Route: IV  PRN Reasons: nausea,vomiting  Start: 02/14/18 2100   Admin Instructions: This is Step 1 of nausea and vomiting management.  If nausea not resolved in 15 minutes, go to Step 2 prochlorperazine (COMPAZINE).  Irritant. For ordered doses up to 4 mg, give IV Push undiluted over 2-5 minutes.      0006 (4 mg)-Given                predniSONE (DELTASONE) tablet 40 mg  Dose: 40 mg  Freq: DAILY Route: PO  Start: 02/19/18 0900   Admin Instructions: Crush and given in food          0933 (40 mg)-Given        0909 (40 mg)-Given           sulfamethoxazole-trimethoprim (BACTRIM DS/SEPTRA DS) 800-160 MG per tablet 1 tablet  Dose: 1 tablet  Freq: 2 TIMES DAILY Route: PO  Indications of Use: ASPIRATION PNEUMONIA,HEALTHCARE-ASSOCIATED PNEUMONIA  Start: 02/19/18 0900   Admin Instructions: Crush and given in food          0933 (1  tablet)-Given       2018 (1 tablet)-Given        0909 (1 tablet)-Given       [ ] 2100           valproic acid (DEPAKENE) solution 250 mg  Dose: 250 mg  Freq: EVERY 8 HOURS SCHEDULED Route: PO  Start: 02/15/18 1400     1715 (250 mg)-Given       2301 (250 mg)-Given        0517 (250 mg)-Given       1403 (250 mg)-Given       2104 (250 mg)-Given        0604 (250 mg)-Given       1400 (250 mg)-Given       2117 (250 mg)-Given        0731 (250 mg)-Given       1413 (250 mg)-Given       2121 (250 mg)-Given        0522 (250 mg)-Given       1416 (250 mg)-Given       2217 (250 mg)-Given        0531 (250 mg)-Given       [ ] 1400       [ ] 2200          Completed Medications  Medications 02/14/18 02/15/18 02/16/18 02/17/18 02/18/18 02/19/18 02/20/18         Dose: 500 mL  Freq: ONCE Route: IV  Last Dose: 500 mL (02/19/18 1300)  Start: 02/19/18 1230   End: 02/19/18 1500         1300 (500 mL)-New Bag              Dose: 500 mL  Freq: ONCE Route: IV  Last Dose: 500 mL (02/19/18 1002)  Start: 02/19/18 1000   End: 02/19/18 1202         1002 (500 mL)-New Bag           Discontinued Medications  Medications 02/14/18 02/15/18 02/16/18 02/17/18 02/18/18 02/19/18 02/20/18         Dose: 62.5 mg  Freq: EVERY 12 HOURS Route: IV  Start: 02/15/18 0800   End: 02/19/18 0839   Admin Instructions: Give Doses 125mg and less IV Push over 2-3 minutes - reconstitute with 2 mL of bacteriostatic water if no diluent is provided. Doses greater than 125 mg need to be in at least 50 mL IVPB.      1000 (62.5 mg)-Given       2035 (62.5 mg)-Given        0746 (62.5 mg)-Given       1957 (62.5 mg)-Given        0845 (62.5 mg)-Given       2004 (62.5 mg)-Given        0740 (62.5 mg)-Given       1950 (62.5 mg)-Given        0839-Med Discontinued                Dose: 3.375 g  Freq: EVERY 6 HOURS Route: IV  Indications of Use: ASPIRATION PNEUMONIA  Last Dose: 3.375 g (02/19/18 0404)  Start: 02/14/18 2200   End: 02/19/18 0839 2158 (3.375 g)-New Bag        0406 (3.375 g)-New  Bag       1114 (3.375 g)-New Bag       1523 (3.375 g)-New Bag       2301 (3.375 g)-New Bag        0346 (3.375 g)-New Bag       0945 (3.375 g)-New Bag       1748 (3.375 g)-New Bag       2220 (3.375 g)-New Bag        0352 (3.375 g)-New Bag       0944 (3.375 g)-New Bag       1626 (3.375 g)-New Bag       2124 (3.375 g)-New Bag        0402 (3.375 g)-New Bag       1053 (3.375 g)-New Bag       1647 (3.375 g)-New Bag       2130 (3.375 g)-New Bag        0404 (3.375 g)-New Bag       0839-Med Discontinued          Rate: 50 mL/hr   Freq: CONTINUOUS Route: IV  Start: 02/19/18 1230   End: 02/19/18 2322         1249 ( )-New Bag       2322-Med Discontinued                 INTERAGENCY TRANSFER FORM - NOTES (H&P, Discharge Summary, Consults, Procedures, Therapies)   2/14/2018                       [address] SURGICAL: [phone]               History & Physicals      H&P by Nicholas Fuller MD at 2/14/2018 10:50 PM     Author:  Nicholas Fuller MD Service:  Hospitalist Author Type:  Physician    Filed:  2/14/2018 11:32 PM Date of Service:  2/14/2018 10:50 PM Creation Time:  2/14/2018 11:09 PM    Status:  Signed :  Nicholas Fuller MD (Physician)         [facility]    History and Physical  Hospitalist       Date of Admission:  2/14/2018[WG1.1]    Assessment & Plan[WG1.2]   Mickie Landin is a 78 year old female who presents with worsening respiratory status.  She comes from a group home with staff there noting that she was having increased cough and wheezing this morning with oxygen saturations dropping into the low 80% range.  Chest x-ray suggested a left-sided infiltrate and Levaquin as ordered but because of worsening symptoms, she was transferred to the ER for evaluation.  At the nursing home she had a fever of 100.7  on arrival to the increased respiratory rate with white count of 5700 normal electrolytes, blood gas with some slight hypercapnia and chest  x-ray showing some left lower lobe atelectasis versus possible fibrosis versus infiltrate.  Flu testing was negative.  Because of her underlying neurological condition of cerebral palsy and mental retardation, there is some concern whether she potentially could have aspirated.  Patient will be admitted to inpatient status for treatment of probable pneumonia and will be treated with Zosyn and vancomycin to cover broad-spectrum organisms with cultures of blood pending.  She is wheezing in the emergency department requiring nebulization treatments and has been given steroids.  It may be that this is all a viral process,, so viral PCR has been ordered.  Expect she will be in the hospital for at least 2 days.[WG1.1]    Active Problems:    Pneumonia due to infectious organism    Assessment:[WG1.3] Presenting with increased cough, fever, wheezing with x-ray suggesting possible left lower lobe infiltrate.  She is a group home resident with risk factors for possible aspiration.  This is concerning for possible broad-spectrum organism.[WG1.1]    Plan:[WG1.3] Broad-spectrum antibiotics with cultures pending.  Consider also viral process we will order viral PCR studies.[WG1.1]    Infantile cerebral palsy (H)    Assessment:[WG1.3] Chronic issues, nonverbal[WG1.1]    Plan:[WG1.3] Supportive care[WG1.1]    Epilepsy (H)    Assessment:[WG1.3] No current symptoms, taking Depakote[WG1.1]    Plan:[WG1.3] Continue Depakote[WG1.1]    Mental retardation    Assessment:[WG1.3] No change, nonverbal[WG1.1]    Plan:[WG1.3] Supportive cares[WG1.1]    Esophageal reflux    Assessment:[WG1.3] Noted in past, taking Prilosec[WG1.1]    Plan:[WG1.3] Continue  # Pain Assessment:[WG1.1]   Current Pain Score 2/14/2018   Patient currently in pain? denies   Pain score (0-10) 0[WG1.2]   - Mickie is unable to participate in a collaborative plan for pain management due to mental retardation and cerebral palsy, nonverbal.   - Pharmacologic adjuvants:  Acetaminophen, has been on narcotics in the past, not sure if she still on these, try to get old records    DVT Prophylaxis: Pneumatic Compression Devices  Code Status: DNR / DNI per previous POLST    Disposition: Expected discharge in 2 days once feeling better.[WG1.1]    Nicholas Fuller MD    Primary Care Physician   Indira Bobo    Chief Complaint[WG1.2]   78-year-old female from group home here with increased cough and wheeze    History is obtained from the electronic health record and emergency department physician[WG1.1]    History of Present Illness[WG1.2]   Mickie Landin is a 78 year old female who presents with increased cough and wheeze.  She is a nonverbal patient with history of cerebral palsy and mental retardation presenting from a group home with increased cough and wheezing.  This is noted by staff this morning with oxygen saturations low at the low 80s Chest x-ray was done which was evidently showing some type of left lower lobe infiltrate and she was ordered to take Levaquin.  Because of worsening status, she was transferred to the emergency department.  Because of her underlying mental retardation, she is unable to give any history whatsoever.[WG1.1]    Past Medical History[WG1.2]    I have reviewed this patient's medical history and updated it with pertinent information if needed.[WG1.1]   Past Medical History:   Diagnosis Date     Constipation      Contusion of thigh 08/10/09     Diaphragmatic hernia without mention of obstruction or gangrene     Hiatal hernia     Epilepsy (H)      Essential and other specified forms of tremor      Infantile cerebral palsy, unspecified      Malignant neoplasm of descending colon (H) 01/23/06    Admit. Discharged 01/29/06     Osteoarthrosis, unspecified whether generalized or localized, unspecified site     Osteoarthritis     Pica      Pneumonia, organism unspecified(486) 01/31/09     Rectal bleeding 6/1/2012     Stricture and stenosis of esophagus       Ulcer of ankle (H) 2011     Unspecified disease of respiratory system     Pulmonary fibrosis     Unspecified intellectual disabilities     Mental retardation     Urinary tract infection, site not specified     Recurrent[WG1.4]       Past Surgical History[WG1.2]   I have reviewed this patient's surgical history and updated it with pertinent information if needed.[WG1.1]  Past Surgical History:   Procedure Laterality Date     C PART REMOVAL COLON W ANASTOMOSIS  06    Partial left colectomy with takedown of splenic flexure.     COLONOSCOPY  2006    Polypectomy     COLONOSCOPY  2007     COLONOSCOPY  08     COLONOSCOPY  04/06/10     COLONOSCOPY  2011    Procedure:COLONOSCOPY; Surgeon:MATT FERRARO; Location:[facility]     HC COLONOSCOPY W/WO BRUSH/WASH  2005    Polypectomy.       HC TOOTH EXTRACTION W/FORCEP  2004    Extraction of teeth #3,6,7,8,9,10,11, &12.  Two quadrant aveoloplasty.  -[facility].[WG1.4]       Prior to Admission Medications   Prior to Admission Medications   Prescriptions Last Dose Informant Patient Reported? Taking?   BIOTENE PBF DRY MOUTH DT PSTE More than a month at Unknown time  Yes No   Sig: use twice daily   BUTT PASTE - REGULAR (DR LOVE POOP GOOP BUTT PASTE FORMULA) More than a month at Unknown time  No No   Sig: Apply topically 2 times daily Apply to buttocks wound BID and prn.   Calamine-Zinc Oxide (RA CALAMINE) 6.971-6.971 % LOTN More than a month at Unknown time  Yes No   Sig: Apply 1 Application topically   DEPAKOTE SPRINKLES 125 MG OR CPSP 2018 at 1200  Yes Yes   Si CAPSULES three times DAILY   MELATONIN PO 2018 at 2000 Care Giver Yes Yes   ORDER FOR DME   No No   Sig: Equipment being ordered: bed rails   ORDER FOR DME   No No   Sig: Equipment being ordered: nebulizer mask   THICK-IT #2 OR 2018 at Unknown time  Yes Yes   Sig: Add to all liquids   acetaminophen (TYLENOL) 325 MG tablet 2018 at 0930  Yes Yes   Sig: Take  325-650 mg by mouth   albuterol (2.5 MG/3ML) 0.083% nebulizer solution 2/14/2018 at 1430  No Yes   Sig: Take 3 mLs (2.5 mg) by nebulization every 6 hours as needed for shortness of breath / dyspnea   bismuth subsalicylate (PEPTO BISMOL) 262 MG/15ML suspension More than a month at Unknown time  Yes No   Sig: Take 15-30 mLs by mouth   carbamide peroxide (EAR DROPS) 6.5 % otic solution More than a month at Unknown time  Yes No   Sig: Place 5-10 drops in ear(s)   docosanol (ABREVA) 10 % CREA cream More than a month at Unknown time  Yes No   Sig: Apply 1 Application topically   loratadine (CLARITIN) 10 MG tablet More than a month at Unknown time  Yes No   nystatin (MYCOSTATIN) cream More than a month at Unknown time  Yes No   Sig: Apply 1 Application topically   order for DME   No No   Sig: Equipment being ordered: Interdry Roll   order for DME   No No   Sig: Equipment being ordered: Interdry roll.  Apply to buttock crease daily and PRN   order for DME   No No   Sig: Equipment being ordered: 4x6 tegaderm  - Apply to buttock daily   order for DME   No No   Sig: Equipment being ordered: Nu skin - apply to sacral area daily and prn   order for DME More than a month at Unknown time  Yes No   Sig: coloplast Critic Aid skin paste, thick moisture barrier paste, apply to buttocks twice daily and as needed.   polyethylene glycol (MIRALAX) powder Unknown at Unknown time  Yes No   Sig: Take 17 g by mouth daily.      Facility-Administered Medications: None     Allergies   Allergies   Allergen Reactions     Baclofen      lethargic       Social History[WG1.2]   I have reviewed this patient's social history and updated it with pertinent information if needed. Mickie Landin[WG1.1]  reports that she has never smoked. She has never used smokeless tobacco. She reports that she does not drink alcohol or use illicit drugs.[WG1.4]    Family History[WG1.2]   I have reviewed this patient's family history and updated it with pertinent  information if needed.[WG1.1]   Family History   Problem Relation Age of Onset     Cancer - colorectal Brother[WG1.4]        Review of Systems[WG1.2]   Review of systems not obtained due to patient factors - mental status[WG1.1]    Physical Exam   Temp: 101  F (38.3  C) Temp src: Rectal BP: (!) 140/94 Pulse: 109   Resp: 26 SpO2: 92 % O2 Device: Nasal cannula Oxygen Delivery: 5 LPM[WG1.2]  Vital Signs with Ranges[WG1.1]  Temp:  [98.6  F (37  C)-101  F (38.3  C)] 101  F (38.3  C)  Pulse:  [] 109  Resp:  [26-40] 26  BP: ()/(72-98) 140/94  SpO2:  [90 %-96 %] 92 %  117 lbs 15.14 oz[WG1.2]    Constitutional: Lying on her side in bed, picking at tubes with somewhat of a tremor  Eyes: Eyes are clear  HEENT: Mouth is dry, no obvious lesions  Respiratory: Bilateral mild wheezing, diminished breath sounds  Cardiovascular: Hard to hear but regular rate, no murmur heard  GI: No obvious tenderness, unable to really fully assess  Lymph/Hematologic: Lymph nodes not enlarged  Genitourinary: Not examined  Skin: No obvious lesions, nursing reports no skin lesions after their more thorough exam.  Musculoskeletal: Sleeping in a fetal position.  Unable to test  Neurologic: Awakens, but unable to cooperate.  Unable to do neurological exam, she does have a tremor.  Psychiatric: Awakens gets jittery but not really responding to any questions[WG1.1]    Data[WG1.2]   Data reviewed today:  I personally reviewed the chest CT image(s) showing Poor inspiration.  Question atelectasis versus fibrosis in the left lower lobe..[WG1.1]    Recent Labs  Lab 02/14/18  1744   WBC 5.7   HGB 13.0   MCV 96      *   POTASSIUM 4.6   CHLORIDE 92*   CO2 33*   BUN 13   CR 0.55   ANIONGAP 6   JOAO 8.6   GLC 67*   ALBUMIN 2.8*   PROTTOTAL 7.0   BILITOTAL 0.2   ALKPHOS 74   ALT 17   AST 16   TROPI <0.015       Recent Results (from the past 24 hour(s))   XR Chest Port 1 View    Narrative    XR CHEST PORT 1 VW 2/14/2018 6:00 PM    HISTORY:  Cough.    COMPARISON: November 22, 2013.      Impression    IMPRESSION: There is linear opacification of the left lateral lung  base, possibly focal atelectasis/scarring or infection. The right lung  is clear. No pleural effusions or pneumothorax. Stable heart and  mediastinum.    BINDU BROWN MD[WG1.2]          Revision History        User Key Date/Time User Provider Type Action    > WG1.4 2/14/2018 11:32 PM Nicholas Fuller MD Physician Sign     WG1.3 2/14/2018 11:25 PM Nicholas Fuller MD Physician      WG1.2 2/14/2018 11:20 PM Nicholas Fuller MD Physician      WG1.1 2/14/2018 11:09 PM Nicholas Fuller MD Physician                      Discharge Summaries      Discharge Summaries by Adelina Miller MD at 2/20/2018 10:28 AM     Author:  Adelina Miller MD Service:  Family Medicine Author Type:  Physician    Filed:  2/20/2018 10:58 AM Date of Service:  2/20/2018 10:28 AM Creation Time:  2/20/2018 10:28 AM    Status:  Signed :  Adelina Miller MD (Physician)         [facility] Discharge Summary    Mickie Landin MRN# [id number]   Age: 78 year old YOB: 1940     Date of Admission:  2/14/2018  Date of Discharge::  2/20/2018  Admitting Physician:  Adelina Miller MD  Discharge Physician:  Adelina Miller MD          Admission Diagnoses:[EP1.1]   Dehydration [E86.0]  Hypoglycemia [E16.2]  COPD exacerbation (H) [J44.1]  Aspiration pneumonia, unspecified aspiration pneumonia type, unspecified laterality, unspecified part of lung (H) [J69.0][EP1.2]          Discharge Diagnosis:[EP1.1]   Principal Problem:    Pneumonia due to infectious organism    Assessment:[EP1.2]   Patient initially treated with Zosyn and vancomycin with clinical improvement and was transitioned to oral Augmentin and Bactrim with ongoing improvement not patient has been off O2 supplementation and is back to clinical baselineed.[EP1.3]       Plan:[EP1.2] We will discharge with course completion of Augmentin and Bactrim, continue support cares at the group home facility.[EP1.3]    Active Problems:    Sepsis (H) - fever, tachypnea, tachycardia    Assessment:[EP1.2]  Secondary to pneumonia, with symptoms now resolved[EP1.3]    Plan:[EP1.2]  Discharge with plan as above[EP1.3]      COPD exacerbation    Assessment:[EP1.2]   Suspected initially with patient having wheezing and tightness with underlying COPD.  Patient did respond well to steroids and nebulization with transition to oral steroids with decreased DuoNeb scheduled frequency with ongoing clinical improvement[EP1.3]    Plan:[EP1.2]   Discharge with ongoing steroid burst and prolonged taper as well as scheduled DuoNeb and as needed albuterol nebs[EP1.3]      Acute respiratory failure with hypoxia (H)    Assessment:[EP1.2] Secondary to pneumonia and possible COPD exacerbation as above with patient weaned off O2 supplementation[EP1.3]    Plan:[EP1.2]   discharge with plan as above[EP1.3]       Dysphagia    Assessment:[EP1.2] Ongoing and significant, with patient on appropriate diet but at high risk for aspiration pneumonia going forward[EP1.3]    Plan:[EP1.2] Discharge without change to home diet[EP1.3]      Infantile cerebral palsy (H)    Assessment:[EP1.2] chronic and stable[EP1.3]    Plan:[EP1.2] Discharge with ongoing supportive care at the group home facility[EP1.3]      Epilepsy (H)    Assessment:[EP1.2] On Depakote[EP1.3]    Plan:[EP1.2] Discharge without change home regimen[EP1.3]      Mental retardation    Assessment:[EP1.2] Chronic and stable[EP1.3]    Plan:[EP1.2] Discharge back to group home setting[EP1.3]      Esophageal reflux    Assessment:[EP1.2]  Previous history.  Patient is not on any medications for this and does not show any symptoms concerning for discomfort.[EP1.3]    Plan:[EP1.2] No medication needed at time of discharge[EP1.3]    # Discharge Pain Plan:[EP1.1]   - Patient  currently has NO PAIN and is not being prescribed pain medications on discharge.[EP1.3]            Procedures:[EP1.1]   No procedures performed during this admission[EP1.3]          Medications Prior to Admission:[EP1.1]     Prescriptions Prior to Admission   Medication Sig Dispense Refill Last Dose     acetaminophen (TYLENOL) 325 MG tablet Take 325-650 mg by mouth   2/14/2018 at 0930     MELATONIN PO    2/13/2018 at 2000     albuterol (2.5 MG/3ML) 0.083% nebulizer solution Take 3 mLs (2.5 mg) by nebulization every 6 hours as needed for shortness of breath / dyspnea 30 vial 0 2/14/2018 at 1430     THICK-IT #2 OR Add to all liquids   2/13/2018 at Unknown time     DEPAKOTE SPRINKLES 125 MG OR CPSP 2 CAPSULES three times DAILY   2/14/2018 at 1200     bismuth subsalicylate (PEPTO BISMOL) 262 MG/15ML suspension Take 15-30 mLs by mouth   More than a month at Unknown time     Calamine-Zinc Oxide (RA CALAMINE) 6.971-6.971 % LOTN Apply 1 Application topically   More than a month at Unknown time     carbamide peroxide (EAR DROPS) 6.5 % otic solution Place 5-10 drops in ear(s)   More than a month at Unknown time     docosanol (ABREVA) 10 % CREA cream Apply 1 Application topically   More than a month at Unknown time     loratadine (CLARITIN) 10 MG tablet    More than a month at Unknown time     nystatin (MYCOSTATIN) cream Apply 1 Application topically   More than a month at Unknown time     order for DME coloplast Critic Aid skin paste, thick moisture barrier paste, apply to buttocks twice daily and as needed.   More than a month at Unknown time     BUTT PASTE - REGULAR (DR LOVE POOP GOOP BUTT PASTE FORMULA) Apply topically 2 times daily Apply to buttocks wound BID and prn. 16 oz 1 More than a month at Unknown time     order for DME Equipment being ordered: Interdry Roll 1 Box 3      order for DME Equipment being ordered: Interdry roll.  Apply to buttock crease daily and PRN 1 Box 3      order for DME Equipment being ordered:  4x6 tegaderm  - Apply to buttock daily 1 Box 3      order for DME Equipment being ordered: Nu skin - apply to sacral area daily and prn 1 Box 3      ORDER FOR DME Equipment being ordered: nebulizer mask 2 Device 1      ORDER FOR DME Equipment being ordered: bed rails 1 Device 0      polyethylene glycol (MIRALAX) powder Take 17 g by mouth daily. 510 g 1 Unknown at Unknown time     BIOTENE PBF DRY MOUTH DT PSTE use twice daily   More than a month at Unknown time[EP1.3]             Discharge Medications:[EP1.1]     Current Discharge Medication List      START taking these medications    Details   ipratropium - albuterol 0.5 mg/2.5 mg/3 mL (DUONEB) 0.5-2.5 (3) MG/3ML neb solution Take 1 vial (3 mLs) by nebulization every 6 hours  Qty: 360 mL, Refills: 0    Associated Diagnoses: COPD exacerbation (H)      predniSONE (DELTASONE) 20 MG tablet Take 2 tablets (40 mg) by mouth daily x4 days, then 1 tablet daily x4 days, then 0.5 tab daily x4 days, then stop  Qty: 14 tablet, Refills: 0    Associated Diagnoses: COPD exacerbation (H); Sepsis, due to unspecified organism (H)      sulfamethoxazole-trimethoprim (BACTRIM DS/SEPTRA DS) 800-160 MG per tablet Take 1 tablet by mouth 2 times daily for 7 doses  Qty: 7 tablet, Refills: 0    Associated Diagnoses: Sepsis, due to unspecified organism (H)      amoxicillin-clavulanate (AUGMENTIN) 875-125 MG per tablet Take 1 tablet by mouth every 12 hours for 7 doses  Qty: 7 tablet, Refills: 0    Associated Diagnoses: Aspiration pneumonia, unspecified aspiration pneumonia type, unspecified laterality, unspecified part of lung (H); Sepsis, due to unspecified organism (H)         CONTINUE these medications which have NOT CHANGED    Details   acetaminophen (TYLENOL) 325 MG tablet Take 325-650 mg by mouth      MELATONIN PO       albuterol (2.5 MG/3ML) 0.083% nebulizer solution Take 3 mLs (2.5 mg) by nebulization every 6 hours as needed for shortness of breath / dyspnea  Qty: 30 vial, Refills: 0     Associated Diagnoses: Wheezing      THICK-IT #2 OR Add to all liquids      DEPAKOTE SPRINKLES 125 MG OR CPSP 2 CAPSULES three times DAILY      bismuth subsalicylate (PEPTO BISMOL) 262 MG/15ML suspension Take 15-30 mLs by mouth      Calamine-Zinc Oxide (RA CALAMINE) 6.971-6.971 % LOTN Apply 1 Application topically      carbamide peroxide (EAR DROPS) 6.5 % otic solution Place 5-10 drops in ear(s)      docosanol (ABREVA) 10 % CREA cream Apply 1 Application topically      loratadine (CLARITIN) 10 MG tablet       nystatin (MYCOSTATIN) cream Apply 1 Application topically      !! order for DME coloplast Critic Aid skin paste, thick moisture barrier paste, apply to buttocks twice daily and as needed.      BUTT PASTE - REGULAR (DR LOVE POOP GOOP BUTT PASTE FORMULA) Apply topically 2 times daily Apply to buttocks wound BID and prn.  Qty: 16 oz, Refills: 1    Associated Diagnoses: Open wound of sacroiliac region without complication      !! order for DME Equipment being ordered: Interdry Roll  Qty: 1 Box, Refills: 3    Associated Diagnoses: Buttock wound, right, initial encounter; Buttock wound, left, initial encounter      !! order for DME Equipment being ordered: Interdry roll.  Apply to buttock crease daily and PRN  Qty: 1 Box, Refills: 3    Associated Diagnoses: Buttock wound, right, initial encounter; Buttock wound, left, initial encounter      !! order for DME Equipment being ordered: 4x6 tegaderm  - Apply to buttock daily  Qty: 1 Box, Refills: 3    Associated Diagnoses: Buttock wound, right, initial encounter; Buttock wound, left, initial encounter      !! order for DME Equipment being ordered: Nu skin - apply to sacral area daily and prn  Qty: 1 Box, Refills: 3    Associated Diagnoses: Buttock wound, right, initial encounter; Buttock wound, left, initial encounter      !! ORDER FOR DME Equipment being ordered: nebulizer mask  Qty: 2 Device, Refills: 1    Associated Diagnoses: Wheezing; SOB (shortness of breath)      !!  ORDER FOR DME Equipment being ordered: bed rails  Qty: 1 Device, Refills: 0    Associated Diagnoses: Infantile cerebral palsy, unspecified; Unspecified epilepsy without mention of intractable epilepsy; Unspecified intellectual disabilities; Osteoarthrosis, unspecified whether generalized or localized, unspecified site; Essential and other specified forms of tremor; Osteopenia; Spinal stenosis      polyethylene glycol (MIRALAX) powder Take 17 g by mouth daily.  Qty: 510 g, Refills: 1      BIOTENE PBF DRY MOUTH DT PSTE use twice daily       !! - Potential duplicate medications found. Please discuss with provider.[EP1.3]                Consultations:[EP1.1]   No consultations were requested during this admission[EP1.3]          Brief History of Illness:[EP1.1]   Patient is an 78-year-old female who presented from her group home with worsening shortness of breath cough, and wheezing with O2 saturations dropping into the low 80 range.  Chest x-ray showed a left-sided infiltrate, and patient had a fever of 100.4.  Influenza testing was negative.  Patient does have known severe dysphasia and is at high risk for aspiration pneumonia.  There is also pneumonia going around in the group home setting which patient has been exposed to.  In the emergency room patient was given steroids, broad-spectrum antibiotics with Zosyn and vancomycin, several nebulization but required ongoing O2 supplementation.  Decision was made to admit patient for ongoing management[EP1.3]          Hospital Course:    In the hospital patient was continued on Zosyn and Vanco as well as IV steroids and scheduled duo nebs with as needed albuterol nebs.  She did have progressive improvement of her symptoms.  Pro-calcitonin level initially was 1.20 and is <0.05 at time of discharge.  She was weaned off O2 supplementation.  She was steroids and antibiotics without complication and is discharged back to the group home in stable condition.  Transition to oral           Physical Exam:[EP1.1]   Vitals were reviewed  Constitutional:   awake, alert, at baseline cognition, no apparent distress, and appears stated age     Lungs:   No increased work of breathing, good air exchange, clear to auscultation bilaterally, no crackles or wheezing     Cardiovascular:   Normal apical impulse, regular rate and rhythm, normal S1 and S2, no S3 or S4, and no murmur noted     Abdomen:   Bowel sounds present, abdomen soft     Musculoskeletal:   Baseline muscle status present with rigidity and intermittent tremor noted     Neurologic:   Awake, alert, at baseline cognition     Skin:[EP1.3]   Patient does have some skin irritation on her right hand and lower jaw, from patient sucking and biting her hand, which apparently is a normal behavior for her in the group home setting.  Neither skin irritated region appears infected.              Discharge Instructions and Follow-Up:   Discharge diet: Dysphasia diet with puréed liquids   Discharge activity:[EP1.1] Activity as tolerated[EP1.3]   Discharge follow-up:[EP1.1] Follow up with primary care provider within 7 days[EP1.3]           Discharge Disposition:[EP1.1]   Discharged to group home[EP1.3]      Attestation:[EP1.1]  I have reviewed today's vital signs, notes, medications, labs and imaging.[EP1.3]    More than 30 minutes was spent on this discharge.      Adelina Miller MD    Note: Chart documentation done in part with Dragon Voice Recognition software. Although reviewed after completion, some word and grammatical errors may remain.[EP1.1]        Revision History        User Key Date/Time User Provider Type Action    > EP1.3 2/20/2018 10:58 AM Adelina Miller MD Physician Sign     EP1.2 2/20/2018 10:29 AM Adelina Miller MD Physician      EP1.1 2/20/2018 10:28 AM Adelina Miller MD Physician                      Consult Notes      Consults by Latisha Duncan at 2/15/2018 12:03 PM     Author:  Dakota  Latisha Service:  Social Work Author Type:   Student    Filed:  2/15/2018 12:05 PM Date of Service:  2/15/2018 12:03 PM Creation Time:  2/15/2018 11:53 AM    Status:  Attested :  Latisha Duncan ( Student)    Cosigner:  Chantal Brewer at 2/15/2018  1:37 PM         Consult Orders:    1. Care Transition RN/SW IP Consult [[id number]] ordered by Nicholas Fuller MD at 02/15/18 1000           Attestation signed by Chantal Brewer at 2/15/2018  1:37 PM        ALINE Palacios                                CARE TRANSITION SOCIAL WORK INITIAL ASSESSMENT:  Reason For Consult: discharge planning   Met with: Caregiver.    DATA  Active Problems:    Infantile cerebral palsy (H)    Epilepsy (H)    Mental retardation    Esophageal reflux    Pneumonia due to infectious organism          Primary Care MD Name: Funmi   Contact information and PCP information verified: Yes      ASSESSMENT  Cognitive Status: awake and alert.             Lives With: facility resident  Living Arrangements: residential facility  Quality Of Family Relationships: supportive, involved  Description of Support System: Supportive, Involved   Who is your support system?: Facility resident(s)/Staff   Support Assessment: Adequate family and caregiver support   Insurance Concerns: No Insurance issues identified        This writer met with caregiver/ anita and introduced self and role. Writer talked on the phone with manager[SK1.1] (Carolyn)[SK1.2] of [facility] about discharge plan. Manager stated that patient was on hospice from end of July to September and was on O2 during that time. Since being off hospice patient has not had O2. Patient is wheelchair bound. Manager stated that if patient goes home with O2 they would need help setting up the O2. Group home will be taking patient home back to [facility] [place] via van.      PLAN    Go back to [facility]     Discharge Planner   Discharge Plans in progress: yes  Barriers to discharge  plan: none   Follow up plan: go to any follow up appointments          Entered by: Latisha Duncan 02/15/2018 11:53 AM          Latisha Guerra[SK1.1]   Social Work[SK1.2] Student[SK1.1]                Revision History        User Key Date/Time User Provider Type Action    > SK1.2 2/15/2018 12:05 PM Latisha Duncan  Student Sign     SK1.1 2/15/2018 11:53 AM Latisha Duncan  Student             Consults by Kaushal Salas RPH at 2018  9:48 PM     Author:  Kaushal Salas RPH Service:  (none) Author Type:  Pharmacist    Filed:  2018  9:48 PM Date of Service:  2018  9:48 PM Creation Time:  2018  9:47 PM    Status:  Signed :  Kaushal Salas RPH (Pharmacist)     Consult Orders:    1. Vancomycin Pharmacy to dose ([facility], [facility]) [[id number]] ordered by Casey Kothari MD at 18                Pharmacy Vancomycin Initial Note  Date of Service 2018  Patient's  1940  78 year old, female    Indication: Aspiration Pneumonia    Current estimated CrCl =[MS1.1] Estimated Creatinine Clearance: 68.9 mL/min (based on Cr of 0.55).[MS1.2]    Creatinine for last 3 days  2018:  5:44 PM Creatinine 0.55 mg/dL    Recent Vancomycin Level(s) for last 3 days  No results found for requested labs within last 72 hours.      Vancomycin IV Administrations (past 72 hours)                   vancomycin (VANCOCIN) 1000 mg in dextrose 5% 200 mL PREMIX (mg) 1,000 mg New Bag 18                Nephrotoxins and other renal medications (Future)    Start     Dose/Rate Route Frequency Ordered Stop    18  piperacillin-tazobactam (ZOSYN) infusion 3.375 g     Comments:  DO NOT USE THIS FIELD FOR ADMIN INSTRUCTIONS; INFORMATION DOES NOT SHOW ON MAR. USE THE FIELD ABOVE MARKED ADMIN INSTRUCTIONS    3.375 g  100 mL/hr over 30 Minutes Intravenous EVERY 6 HOURS 18 2100      18  vancomycin (VANCOCIN) 1000 mg in dextrose 5% 200 mL  PREMIX      1,000 mg  200 mL/hr over 1 Hours Intravenous EVERY 24 HOURS 02/14/18 1948            Contrast Orders - past 72 hours     None                Plan:  1.  Start vancomycin  1000 mg IV q24h.   2.  Goal Trough Level: 15-20 mg/L   3.  Pharmacy will check trough levels as appropriate in 1-3 Days.    4. Serum creatinine levels will be ordered daily for the first week of therapy and at least twice weekly for subsequent weeks.    5. [place] method utilized to dose vancomycin therapy: Method 2    Kaushal Salas[MS1.1]         Revision History        User Key Date/Time User Provider Type Action    > MS1.2 2/14/2018  9:48 PM Kaushal Salas [facility] Pharmacist Sign     MS1.1 2/14/2018  9:47 PM Kaushal Salas [facility] Pharmacist                      Progress Notes - Physician (Notes for yesterday and today)      Progress Notes by Adelina Miller MD at 2/19/2018  7:37 AM     Author:  Adelina Miller MD Service:  Family Medicine Author Type:  Physician    Filed:  2/19/2018 12:32 PM Date of Service:  2/19/2018  7:37 AM Creation Time:  2/19/2018  7:37 AM    Status:  Signed :  Adelina Miller MD (Physician)         [facility] Progress Note[EP1.1]          Assessment and Plan:   Assessment:   Principal Problem:    Pneumonia due to infectious organism    Assessment:[EP1.2] Thought to be secondary to aspiration pneumonia and patient was high risk, however does live at a group home setting, therefore may have health care acquired component.  pro calcitonin has been trending downward and patient has been weaning off of O2.  Has needed 1/2 L overnight to maintain saturations[EP1.3]    Plan:[EP1.2] We will transition patient to oral Augmentin and Bactrim and transition steroids to be oral prednisone as well, continue to wean O2 supplementation as able and monitor closely for ongoing signs of clinical improvement.  Given the fever yesterday and high temp today, will proceed with  work up as below to further evaluate.[EP1.3]      Active Problems:    Sepsis (H) - fever, tachypnea, tachycardia    Assessment:[EP1.2] patient did have a temperature of 100.3 currently and on review of chart had one of 100.9 yesterday morning after being fever free for 24 hours.  Patient continues to be improving from a respiratory standpoint and there is no other concern for worsening sepsis[EP1.3]    Plan:[EP1.2] We will give Tylenol ×1 now to improve patient's comfort, but will also perform blood cultures ×2, repeat pro calcitonin level, give a 500 cc fluid bolus now and obtain continue to monitor patient closely and transition to oral antibiotics as aboveCBC and lactic acid level.[EP1.3]        Acute respiratory failure with hypoxia (H)    Assessment: secondary to pneumonia as above with oxygen needs continuing to decrease as above    Plan:[EP1.2] Continue to wean O2 supplementation as tolerated[EP1.3]      Dysphagia    Assessment: patient had video swallow study last year which showed ongoing aspiration with patient at high risk for aspiration pneumonia going forward, graduated from hospice two months ago as she was doing well.  Patient eating enthusiastically     Plan:[EP1.2] Continue with current dysphagia diet[EP1.3]      Infantile cerebral palsy (H)    Assessment: chronic and stable    Plan: continue supportive cares      Epilepsy (H)    Assessment: previous history    Plan: continue Depakote      Mental retardation    Assessment: chronic and stable, patient non-verbal    Plan: continue supportive care      Esophageal reflux    Assessment: previous history however the patient does not take anything chronically    Plan: continue to monitor, no medications at this time.[EP1.2]       # Pain Assessment:   Current Pain Score 2/18/2018 2/18/2018 2/18/2018   Patient currently in pain? sleeping: patient not able to self report no no   Pain score (0-10) - - -   - Mickie is unable to participate in a collaborative plan  "for pain management due to baseline cognitive function and non-verbal status - per nursing patient has not shown any non-verbal cues concerning for pain.[EP1.1]   - Patient does not appear to be in pain - no medications are needed[EP1.4]    VTE:[EP1.1]  SCDs[EP1.4]  Code Status:[EP1.1]  DNR/DNI[EP1.4]        Interval History:[EP1.1]   Continues to improve slowly - is tolerating periods on RA at rest when in a good position and wide awake but needs 1/2-1L NC oxygen supplementation with sleep or when in certain positions.  Vital signs generally better[EP1.4] apart from fever of 100.9 yesterday and temp of 100.3 this morning after patient had been fever free for 24 hours[EP1.3].  Eating and voiding well.  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 Discharged 01/29/06     Osteoarthrosis, unspecified whether generalized or localized, unspecified site     Osteoarthritis     Pica      Pneumonia, organism unspecified(486) 01/31/09     Rectal bleeding 6/1/2012     Stricture and stenosis of esophagus      Ulcer of ankle (H) 4/28/2011     Unspecified disease of respiratory system     Pulmonary fibrosis     Unspecified intellectual disabilities     Mental retardation     Urinary tract infection, site not specified     Recurrent[EP1.5]            Physical Exam:   Blood pressure 139/77, pulse 91, temperature 99.5  F (37.5  C), temperature source Axillary, resp. rate 18, height 1.626 m (5' 4\"), weight 53.5 kg (117 lb 15.1 oz), SpO2 90 %.[EP1.1]  Constitutional:[EP1.4]   Awake, alert, appears more uncomfortable today - not " smiling as much and seems more restless[EP1.6]     Lungs:[EP1.4]   No increased work of breathing, good air exchange, very fine crackles present but improved from yesterday[EP1.6]     Cardiovascular:[EP1.4]   Normal apical impulse, regular rate and rhythm, normal S1 and S2, no S3 or S4, and no murmur noted[EP1.6]     Abdomen:[EP1.4]   Bowel sounds present, abdomen soft[EP1.6]     Musculoskeletal:[EP1.4]   No lower extremity pitting edema noted, ongoing baseline rigidity and tremor present[EP1.6]     Neurologic:[EP1.4]   Awake, alert, non-verbal[EP1.6], still making eye contact but not as social and appears slightly uncomfortable[EP1.7]     Skin:[EP1.4]   Skin is very warm to the touch[EP1.7]             Data:[EP1.1]   All laboratory data reviewed[EP1.4]    Attestation:  I have reviewed today's vital signs, notes, medications, labs and imaging.     Electronically Signed:  Adelina Miller MD    Note: Chart documentation done in part with Dragon Voice Recognition software. Although reviewed after completion, some word and grammatical errors may remain.[EP1.1]          Revision History        User Key Date/Time User Provider Type Action    > EP1.3 2/19/2018 12:32 PM Adelina Miller MD Physician Sign     EP1.7 2/19/2018 12:01 PM Adelina Miller MD Physician      EP1.6 2/19/2018 11:57 AM Adelina Miller MD Physician      EP1.2 2/19/2018  7:44 AM Adelina Miller MD Physician      EP1.5 2/19/2018  7:43 AM Adelina Miller MD Physician      EP1.4 2/19/2018  7:42 AM Adelina Miller MD Physician      EP1.1 2/19/2018  7:37 AM Adelina Miller MD Physician             Progress Notes by Adelina Miller MD at 2/19/2018 12:26 PM     Author:  Adelina Miller MD Service:  Family Medicine Author Type:  Physician    Filed:  2/19/2018 12:28 PM Date of Service:  2/19/2018 12:26 PM Creation Time:  2/19/2018 12:26 PM    Status:   Signed :  Adelina Miller MD (Physician)         Patient is appearing much more comfortable this afternoon following tylenol given this morning and is currently weaned down to RA.  She ate very well for lunch.  Lactic acid has returned and is slightly elevated at 3.0 - will perform another fluid bolus and start 50 cc/hr maintenance fluids while awaiting the rest of the results.  Will recheck a lactic acid level tomorrow morning or sooner if increased concern for worsening infection.    Electronically Signed:  Adelnia Miller MD[EP1.1]       Revision History        User Key Date/Time User Provider Type Action    > EP1.1 2/19/2018 12:28 PM Adelina Miller MD Physician Sign                  Procedure Notes     No notes of this type exist for this encounter.      Progress Notes - Therapies (Notes from 02/17/18 through 02/20/18)     No notes of this type exist for this encounter.                                          INTERAGENCY TRANSFER FORM - LAB / IMAGING / EKG / EMG RESULTS   2/14/2018                       [address] SURGICAL: [phone]            Unresulted Labs     None         Lab Results - 3 Days      Procalcitonin [[id number]]  Resulted: 02/20/18 1041, Result status: Final result    Ordering provider: Adelina Miller MD  02/20/18 0000 Resulting lab: [facility]    Specimen Information    Type Source Collected On   Blood  02/20/18 0527          Components       Value Reference Range Flag Lab   Procalcitonin <0.05 ng/ml  51   Comment:         <0.05 ng/ml  Normal  Recommendation: Very low risk of bacterial infection.   Discourage antibiotics unless strong clinical suspicion for serious infection.              Basic metabolic panel [[id number]]  Resulted: 02/20/18 0600, Result status: Final result    Ordering provider: Adelina Miller MD  02/20/18 0000 Resulting lab: [facility]     Specimen Information    Type Source Collected On   Blood  02/20/18 0527          Components       Value Reference Range Flag Lab   Sodium 142 133 - 144 mmol/L  [facility]   Potassium 3.9 3.4 - 5.3 mmol/L  [facility]   Chloride 104 94 - 109 mmol/L  [facility]   Carbon Dioxide 32 20 - 32 mmol/L  [facility]   Anion Gap 6 3 - 14 mmol/L  [facility]   Glucose 83 70 - 99 mg/dL  [facility]   Urea Nitrogen 19 7 - 30 mg/dL  [facility]   Creatinine 0.64 0.52 - 1.04 mg/dL  [facility]   GFR Estimate 89 >60 mL/min/1.7m2  [facility]   Comment:  Non  GFR Calc   GFR Estimate If Black >90 >60 mL/min/1.7m2  [facility]   Comment:  African American GFR Calc   Calcium 8.8 8.5 - 10.1 mg/dL  [facility]            CBC with platelets [[id number]] (Abnormal)  Resulted: 02/20/18 0544, Result status: Final result    Ordering provider: Adelina Miller MD  02/20/18 0000 Resulting lab: [facility]    Specimen Information    Type Source Collected On   Blood  02/20/18 0527          Components       Value Reference Range Flag Lab   WBC 7.9 4.0 - 11.0 10e9/L  [facility]   RBC Count 4.24 3.8 - 5.2 10e12/L  [facility]   Hemoglobin 12.8 11.7 - 15.7 g/dL  [facility]   Hematocrit 41.1 35.0 - 47.0 %  [facility]   MCV 97 78 - 100 fl  [facility]   MCH 30.2 26.5 - 33.0 pg  [facility]   MCHC 31.1 31.5 - 36.5 g/dL L [facility]   RDW 15.4 10.0 - 15.0 % H [facility]   Platelet Count 298 150 - 450 10e9/L  [facility]            Lactic acid whole blood [[id number]]  Resulted: 02/20/18 0542, Result status: Final result    Ordering provider: Adelina Miller MD  02/20/18 0000 Resulting lab: [facility]    Specimen Information    Type Source Collected On   Blood  02/20/18 0527          Components       Value Reference Range Flag Lab   Lactic Acid 1.6 0.7 - 2.0 mmol/L  [facility]            Procalcitonin [[id number]]  Resulted: 02/19/18 1438, Result status: Final result    Ordering provider: Adelina Miller MD  02/19/18 0737 Resulting lab:  [facility]    Specimen Information    Type Source Collected On   Blood  02/19/18 0708          Components       Value Reference Range Flag Lab   Procalcitonin <0.05 ng/ml  51   Comment:         <0.05 ng/ml  Normal  Recommendation: Very low risk of bacterial infection.   Discourage antibiotics unless strong clinical suspicion for serious infection.              Lactic acid whole blood [[id number]] (Abnormal)  Resulted: 02/19/18 1214, Result status: Final result    Ordering provider: Adelina Miller MD  02/19/18 0950 Resulting lab: [facility]    Specimen Information    Type Source Collected On   Blood  02/19/18 1204          Components       Value Reference Range Flag Lab   Lactic Acid 3.0 0.7 - 2.0 mmol/L H [facility]   Comment:  Significant value called to and read back by  KAVITA DEVINE AT 1214 SC              Lactic acid whole blood [[id number]]  Resulted: 02/19/18 0921, Result status: Final result    Ordering provider: Adelina Miller MD  02/19/18 0845 Resulting lab: [facility]    Specimen Information    Type Source Collected On   Blood  02/19/18 0908          Components       Value Reference Range Flag Lab   Lactic Acid 2.0 0.7 - 2.0 mmol/L  [facility]            CBC with platelets [[id number]] (Abnormal)  Resulted: 02/19/18 0918, Result status: Final result    Ordering provider: Adelina Miller MD  02/19/18 0845 Resulting lab: [facility]    Specimen Information    Type Source Collected On   Blood  02/19/18 0908          Components       Value Reference Range Flag Lab   WBC 6.4 4.0 - 11.0 10e9/L  [facility]   RBC Count 4.37 3.8 - 5.2 10e12/L  [facility]   Hemoglobin 13.2 11.7 - 15.7 g/dL  [facility]   Hematocrit 42.8 35.0 - 47.0 %  [facility]   MCV 98 78 - 100 fl  [facility]   MCH 30.2 26.5 - 33.0 pg  [facility]   MCHC 30.8 31.5 - 36.5 g/dL L [facility]   RDW 15.3 10.0 - 15.0 % H [facility]   Platelet Count  286 150 - 450 10e9/L  [place] [facility]            Blood culture [[id number]]  Resulted: 02/19/18 0914, Result status: In process    Ordering provider: Adelina Miller MD  02/19/18 0845 Resulting lab: [facility]    Specimen Information    Type Source Collected On   Blood  02/19/18 0908            Blood culture [[id number]]  Resulted: 02/19/18 0914, Result status: In process    Ordering provider: Adelina Miller MD  02/19/18 0845 Resulting lab: [facility]    Specimen Information    Type Source Collected On   Blood  02/19/18 0900            Basic metabolic panel [[id number]] (Abnormal)  Resulted: 02/19/18 0733, Result status: Final result    Ordering provider: Adelina Miller MD  02/19/18 0000 Resulting lab: [facility]    Specimen Information    Type Source Collected On   Blood  02/19/18 0708          Components       Value Reference Range Flag Lab   Sodium 143 133 - 144 mmol/L  [facility]   Potassium 3.9 3.4 - 5.3 mmol/L  [facility]   Chloride 102 94 - 109 mmol/L  [facility]   Carbon Dioxide 34 20 - 32 mmol/L H [facility]   Anion Gap 7 3 - 14 mmol/L  [facility]   Glucose 130 70 - 99 mg/dL H [facility]   Urea Nitrogen 23 7 - 30 mg/dL  [facility]   Creatinine 0.62 0.52 - 1.04 mg/dL  [facility]   GFR Estimate >90 >60 mL/min/1.7m2  [facility]   Comment:  Non  GFR Calc   GFR Estimate If Black >90 >60 mL/min/1.7m2  [facility]   Comment:  African American GFR Calc   Calcium 9.2 8.5 - 10.1 mg/dL  [facility]            Procalcitonin [[id number]]  Resulted: 02/17/18 2328, Result status: Final result    Ordering provider: Adelina Miller MD  02/17/18 0000 Resulting lab: [facility]    Specimen Information    Type Source Collected On   Blood  02/17/18 0615          Components       Value Reference Range Flag Lab   Procalcitonin 0.15 ng/ml  51   Comment:         0.05-0.24 ng/ml Low risk of systemic bacterial infection. Local bacterial   infection possible.   Recommendation: Assess other clinical features of   infection. Discourage antibiotics unless strong clinical suspicion for serious   infection.              Basic metabolic panel [[id number]] (Abnormal)  Resulted: 02/17/18 0643, Result status: Final result    Ordering provider: Adelina Miller MD  02/17/18 0000 Resulting lab: [facility]    Specimen Information    Type Source Collected On   Blood  02/17/18 0615          Components       Value Reference Range Flag Lab   Sodium 148 133 - 144 mmol/L H [facility]   Potassium 3.6 3.4 - 5.3 mmol/L  [facility]   Chloride 110 94 - 109 mmol/L H [facility]   Carbon Dioxide 31 20 - 32 mmol/L  [facility]   Anion Gap 7 3 - 14 mmol/L  [facility]   Glucose 137 70 - 99 mg/dL H [facility]   Urea Nitrogen 21 7 - 30 mg/dL  [facility]   Creatinine 0.58 0.52 - 1.04 mg/dL  [facility]   GFR Estimate >90 >60 mL/min/1.7m2  [facility]   Comment:  Non  GFR Calc   GFR Estimate If Black >90 >60 mL/min/1.7m2  [facility]   Comment:  African American GFR Calc   Calcium 8.5 8.5 - 10.1 mg/dL  [facility]            Testing Performed By     Lab - Abbreviation Name Director Address Valid Date Range    22 - FN Lab [facility] Unknown [address] [place] [address] 05/08/15 1057 - Present    45 - [id number] [facility] Unknown Unknown 01/28/02 0000 - Present    51 - Unknown [facility] Unknown [address] [address] 12/31/14 1010 - Present            Encounter-Level Documents:     There are no encounter-level documents.      Order-Level Documents:     There are no order-level documents.

## 2018-02-14 NOTE — IP AVS SNAPSHOT
[address] [facility]    [address] [place] [address]    Phone:  [phone]                                       After Visit Summary   2/14/2018    Mickie Landin    MRN: [id number]           After Visit Summary Signature Page     I have received my discharge instructions, and my questions have been answered. I have discussed any challenges I see with this plan with the nurse or doctor.    ..........................................................................................................................................  Patient/Patient Representative Signature      ..........................................................................................................................................  Patient Representative Print Name and Relationship to Patient    ..................................................               ................................................  Date                                            Time    ..........................................................................................................................................  Reviewed by Signature/Title    ...................................................              ..............................................  Date                                                            Time

## 2018-02-14 NOTE — ED PROVIDER NOTES
History     Chief Complaint   Patient presents with     Wheezing     The history is provided by a caregiver. History limited by: patient is nonverbal and history if obtained by the caregiver.     Mickie Landin is a 78 year old female who presents to the emergency department accompanied by her caregiver with wheezing. It is reported that patient was coughing and wheezing this morning so an x-ray was obtained. She was diagnosed with aspiration pneumonia and placed on levofloxacin. She also had a fever that improved throughout the day. Patient was given a nebulizer around 1430, she was still stating around 87-88. At 1600 her stats had dropped to 81. Her caregiver reports that she normally only gets nebulizers when she is sick. Her O2 stats are normally around 95 when she is well. Her caregiver notes that she was on oxygen 24/7 and was recently taken off because she was doing well. She had a dose of levofloxacin around 1500.     Problem List:    Patient Active Problem List    Diagnosis Date Noted     Health Care Home 11/25/2014     Priority: Medium     No longer active with [facility] case management effective 11/30/14.           Spinal stenosis 09/24/2013     Priority: Medium     Weight loss 06/01/2012     Priority: Medium     Fibrosis of lung (H) 05/24/2012     Priority: Medium     (Problem list name updated by automated process. 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  Document previously scanned on 6-5-12.  Order reviewed and found to be valid.  Code Status reflects choices in most recent ACP document. Documents  received from [facility] indicate patient has a guardian (county) however no documents on file to confirm. Received two DHS Authorizations for code status; most recent  14. Please obtain copies of legal document at next opportunity. Confirmed/documented designated decision maker(s).  Added by Madai Corbin RN, System Director ACP-Honoring Choices                Chronic constipation 10/02/2011     Priority: Medium     Dysphagia 10/02/2011     Priority: Medium     Chronic rhinitis 2011     Priority: Medium     CARDIOVASCULAR SCREENING; LDL GOAL LESS THAN 130 10/31/2010     Priority: Medium     Osteopenia 2010     Priority: Medium     History of colon cancer 2010     Priority: Medium     Esophageal reflux 10/31/2001     Priority: Medium     Idiopathic interstitial pneumonia (H) 10/31/2001     Priority: Medium     Problem list name updated by automated process. Provider to review and confirm  [employer] Update utility       Infantile cerebral palsy (H)      Priority: Medium     Problem list name updated by automated process. Provider to review       Epilepsy (H)      Priority: Medium     Problem list name updated by automated process. Provider to review       Mental retardation      Priority: Medium     Problem list name updated by automated process. Provider to review       Osteoarthritis      Priority: Medium     Problem list name updated by automated process. Provider to review       Diaphragmatic hernia      Priority: Medium     Problem list name updated by automated process. Provider to review       Essential and other specified forms of tremor      Priority: Medium        Past Medical History:    Past Medical History:   Diagnosis Date     Constipation      Contusion of thigh 08/10/09     Diaphragmatic hernia without mention of obstruction or gangrene      Epilepsy (H)      Essential and other specified forms of tremor      Infantile cerebral palsy, unspecified      Malignant neoplasm of descending  colon (H) 01/23/06     Osteoarthrosis, unspecified whether generalized or localized, unspecified site      Pica      Pneumonia, organism unspecified(486) 01/31/09     Rectal bleeding 6/1/2012     Stricture and stenosis of esophagus      Ulcer of ankle (H) 4/28/2011     Unspecified disease of respiratory system      Unspecified intellectual disabilities      Urinary tract infection, site not specified        Past Surgical History:    Past Surgical History:   Procedure Laterality Date     C PART REMOVAL COLON W ANASTOMOSIS  01/23/06    Partial left colectomy with takedown of splenic flexure.     COLONOSCOPY  11/01/2006    Polypectomy     COLONOSCOPY  12/19/2007     COLONOSCOPY  12/19/08     COLONOSCOPY  04/06/10     COLONOSCOPY  4/1/2011    Procedure:COLONOSCOPY; Surgeon:MATT FERRARO; Location:[facility]     HC COLONOSCOPY W/WO BRUSH/WASH  11/23/2005    Polypectomy.       HC TOOTH EXTRACTION W/FORCEP  05/24/2004    Extraction of teeth #3,6,7,8,9,10,11, &12.  Two quadrant aveoloplasty.  F-[facility].       Family History:    Family History   Problem Relation Age of Onset     Cancer - colorectal Brother        Social History:  Marital Status:  Single [1]  Social History   Substance Use Topics     Smoking status: Never Smoker     Smokeless tobacco: Never Used      Comment: no smokers in the household     Alcohol use No        Medications:      order for DME   BUTT PASTE - REGULAR (DR LOVE POOP GOOP BUTT PASTE FORMULA)   MELATONIN PO   order for DME   order for DME   order for DME   order for DME   fentaNYL (DURAGESIC) 12 mcg/hr patch 72 hr   LANsoprazole (PREVACID) 30 MG capsule   ORDER FOR DME   albuterol (2.5 MG/3ML) 0.083% nebulizer solution   ORDER FOR DME   tolterodine (DETROL) 1 MG tablet   Multiple Vitamin (MULTIVITAMINS PO)   calcium-vitamin D (CALTRATE) 600-400 MG-UNIT per tablet   polyethylene glycol (MIRALAX) powder   fluticasone (FLONASE) 50 MCG/ACT nasal spray   BIOTENE PBF DRY MOUTH DT PSTE   DIVALPROEX SODIUM  125 MG PO CPSP   THICK-IT #2 OR   TYLENOL PM EXTRA STRENGTH 500-25 MG OR TABS   VITAMIN C 500 MG OR TABS   ASPIRIN 81 MG OR TABS   DEPAKOTE SPRINKLES 125 MG OR CPSP   GLUCOSAMINE CHONDR 500 COMPLEX OR CAPS         Review of Systems   Unable to perform ROS: Patient nonverbal       Physical Exam   BP: (!) 111/98  Pulse: 97  Temp: 98.6  F (37  C)  SpO2: 92 %      Physical Exam   Constitutional: She appears well-developed and well-nourished.   Patient is tremorless in appearance.    HENT:   Head: Normocephalic and atraumatic.   Eyes: Conjunctivae are normal. Pupils are equal, round, and reactive to light.   Neck: Normal range of motion. Neck supple.   Cardiovascular: Regular rhythm and normal heart sounds.  Tachycardia present.    Pulmonary/Chest: Effort normal. She has wheezes (only in the left feild). She has no rhonchi. She has no rales.   Abdominal: Soft. Bowel sounds are normal. There is no tenderness. There is no rebound and no guarding.   Musculoskeletal: She exhibits no edema.   Patient's upper extremities are contracted.   Neurological: She is unresponsive.   Skin: Skin is warm and dry.   Psychiatric: She is noncommunicative.       ED Course     ED Course     Procedures           Results for orders placed or performed during the hospital encounter of 02/14/18   XR Chest Port 1 View    Narrative    XR CHEST PORT 1 VW 2/14/2018 6:00 PM    HISTORY: Cough.    COMPARISON: November 22, 2013.      Impression    IMPRESSION: There is linear opacification of the left lateral lung  base, possibly focal atelectasis/scarring or infection. The right lung  is clear. No pleural effusions or pneumothorax. Stable heart and  mediastinum.    BINDU BROWN MD   CBC with platelets differential   Result Value Ref Range    WBC 5.7 4.0 - 11.0 10e9/L    RBC Count 4.26 3.8 - 5.2 10e12/L    Hemoglobin 13.0 11.7 - 15.7 g/dL    Hematocrit 40.7 35.0 - 47.0 %    MCV 96 78 - 100 fl    MCH 30.5 26.5 - 33.0 pg    MCHC 31.9 31.5 - 36.5 g/dL     RDW 14.7 10.0 - 15.0 %    Platelet Count 206 150 - 450 10e9/L    Diff Method Automated Method     % Neutrophils 56.3 %    % Lymphocytes 27.8 %    % Monocytes 14.5 %    % Eosinophils 0.9 %    % Basophils 0.2 %    % Immature Granulocytes 0.3 %    Absolute Neutrophil 3.2 1.6 - 8.3 10e9/L    Absolute Lymphocytes 1.6 0.8 - 5.3 10e9/L    Absolute Monocytes 0.8 0.0 - 1.3 10e9/L    Absolute Eosinophils 0.1 0.0 - 0.7 10e9/L    Absolute Basophils 0.0 0.0 - 0.2 10e9/L    Abs Immature Granulocytes 0.0 0 - 0.4 10e9/L   Comprehensive metabolic panel   Result Value Ref Range    Sodium 131 (L) 133 - 144 mmol/L    Potassium 4.6 3.4 - 5.3 mmol/L    Chloride 92 (L) 94 - 109 mmol/L    Carbon Dioxide 33 (H) 20 - 32 mmol/L    Anion Gap 6 3 - 14 mmol/L    Glucose 67 (L) 70 - 99 mg/dL    Urea Nitrogen 13 7 - 30 mg/dL    Creatinine 0.55 0.52 - 1.04 mg/dL    GFR Estimate >90 >60 mL/min/1.7m2    GFR Estimate If Black >90 >60 mL/min/1.7m2    Calcium 8.6 8.5 - 10.1 mg/dL    Bilirubin Total 0.2 0.2 - 1.3 mg/dL    Albumin 2.8 (L) 3.4 - 5.0 g/dL    Protein Total 7.0 6.8 - 8.8 g/dL    Alkaline Phosphatase 74 40 - 150 U/L    ALT 17 0 - 50 U/L    AST 16 0 - 45 U/L   Blood gas venous   Result Value Ref Range    Ph Venous 7.42 7.32 - 7.43 pH    PCO2 Venous 54 (H) 40 - 50 mm Hg    PO2 Venous 41 25 - 47 mm Hg    Bicarbonate Venous 34 (H) 21 - 28 mmol/L    Base Excess Venous 8.1 mmol/L    FIO2 32    Nt probnp inpatient (BNP)   Result Value Ref Range    N-Terminal Pro BNP Inpatient 295 0 - 1800 pg/mL   Troponin I   Result Value Ref Range    Troponin I ES <0.015 0.000 - 0.045 ug/L   Lactic acid whole blood   Result Value Ref Range    Lactic Acid 0.9 0.7 - 2.0 mmol/L   Glucose by meter   Result Value Ref Range    Glucose 115 (H) 70 - 99 mg/dL   Influenza A/B antigen   Result Value Ref Range    Influenza A/B Agn Specimen Nasopharyngeal     Influenza A Negative NEG^Negative    Influenza B Negative NEG^Negative     Medications   vancomycin (VANCOCIN) 1000 mg in  dextrose 5% 200 mL PREMIX (0 mg Intravenous ED Infusing on Admission/transfer 2/14/18 2048)   naloxone (NARCAN) injection 0.1-0.4 mg (not administered)   0.9% sodium chloride infusion (not administered)   acetaminophen (TYLENOL) Suppository 650 mg (not administered)   ondansetron (ZOFRAN-ODT) ODT tab 4 mg (not administered)     Or   ondansetron (ZOFRAN) injection 4 mg (not administered)   methylPREDNISolone sodium succinate (solu-MEDROL) injection 62.5 mg (not administered)   ipratropium - albuterol 0.5 mg/2.5 mg/3 mL (DUONEB) neb solution 3 mL (not administered)   albuterol neb solution 2.5 mg (not administered)   piperacillin-tazobactam (ZOSYN) infusion 3.375 g (not administered)   0.9% sodium chloride BOLUS (0 mLs Intravenous Stopped 2/14/18 1950)   dextrose 50 % injection 25 mL (25 mLs Intravenous Given 2/14/18 1849)   acetaminophen (TYLENOL) Suppository 650 mg (650 mg Rectal Given 2/14/18 1947)   methylPREDNISolone sodium succinate (solu-MEDROL) injection 125 mg (125 mg Intravenous Given 2/14/18 1947)     Labs were reviewed and patient did have a normal white blood cell count and venous blood gas was relatively stable also.  Rapid influenza was negative but we do have a respiratory virus panel pending.  Chest x-ray shows possible left lateral lung atelectasis versus infection.  I am concerned now because she does have a fever after she has been here and she is having an increasing respiratory rate.  She certainly did have some wheezing upon arrival but it has gotten somewhat worse.  Recommend go ahead and treated with some IV steroids and she was given multiple neb treatments here.  With her inability to swallow well, she is at high risk for aspiration.  She was already gotten 1 dose of Levaquin today but I think we need to cover her for possible aspiration pneumonia.  We will start her on Zosyn and vancomycin.  Will need to continue to do multiple neb treatments and close follow-up.  Patient is a DNR/DNI.  I  discussed the case with the hospitalist and we will plan on admission to the hospital, further IV antibiotics and nebs and IV steroids.  While in the emergency department, the patient did have a low blood sugar that was noted on the comprehensive.  Because the patient has not been eating or drinking well, this is most likely from that.  I did not feel comfortable trying to give her any juice with thickener as I was already worried about aspiration so I did give her a half amp of D50 which did bring her blood sugar up.    Assessments & Plan (with Medical Decision Making)  COPD exacerbation, aspiration pneumonia, dehydration, hypoglycemia     I have reviewed the nursing notes.    I have reviewed the findings, diagnosis, plan and need for follow up with the patient.          Final diagnoses:   Aspiration pneumonia, unspecified aspiration pneumonia type, unspecified laterality, unspecified part of lung (H)   COPD exacerbation (H)   Dehydration   Hypoglycemia     This document serves as a record of services personally performed by Casey Kothari MD. It was created on their behalf by Daniela Martinez, a trained medical scribe. The creation of this record is based on the provider's personal observations and the statements of the patient. This document has been checked and approved by the attending provider.  Note: Chart documentation done in part with Dragon Voice Recognition software. Although reviewed after completion, some word and grammatical errors may remain.  2/14/2018   [facility] EMERGENCY DEPARTMENT     Casey Kothari MD  02/14/18 0346

## 2018-02-14 NOTE — LETTER
Transition Communication Hand-off for Care Transitions to Next Level of Care Provider    Name: Mickie Landin  MRN #: [id number]  Primary Care Provider: Indira Bobo  Primary Care MD Name: Funmi   Primary Clinic: [facility] [address] [address]     Reason for Hospitalization:  Dehydration [E86.0]  Hypoglycemia [E16.2]  COPD exacerbation (H) [J44.1]  Aspiration pneumonia, unspecified aspiration pneumonia type, unspecified laterality, unspecified part of lung (H) [J69.0]  Admit Date/Time: 2/14/2018  5:06 PM  Discharge Date: 2/20/2018  Payor Source: Payor: MEDICA / Plan: MEDICA [employer] NON/FV PARTNERS / Product Type: Indemnity /     Readmission Assessment Measure (KANIKA) Risk Score/category: Average         Reason for Communication Hand-off Referral: Admission diagnoses: PN    Discharge Plan:  Discharge Plan:       Most Recent Value    Concerns To Be Addressed other (see comments)    Concerns Comments will return to group home upon discharge           Concern for non-adherence with plan of care:   Y/N no  Discharge Needs Assessment:  Needs       Most Recent Value    Transportation Available van, wheelchair accessible        Follow-up plan:  No future appointments.      Key Recommendations:  Patient will return to group home after hospital discharge    Nanci Flores, MSN, RN, Care Coordinator  [facility] [phone]  [facility] [phone]    AVS/Discharge Summary is the source of truth; this is a helpful guide for improved communication of patient story

## 2018-02-14 NOTE — IP AVS SNAPSHOT
"[address] MEDICAL SURGICAL: [phone]                                              INTERAGENCY TRANSFER FORM - PHYSICIAN ORDERS   2018                    Hospital Admission Date: 2018  MELANIE MEDEIROS   : 1940  Sex: Female        Attending Provider: Adelina Miller MD     Allergies:  Baclofen    Infection:  None   Service:  HOSPITALIST    Ht:  1.626 m (5' 4\")   Wt:  53.5 kg (117 lb 15.1 oz)   Admission Wt:  53.5 kg (117 lb 15.1 oz)    BMI:  20.25 kg/m 2   BSA:  1.55 m 2            Patient PCP Information     Provider PCP Type    Indira Bobo PA-C General      ED Clinical Impression     Diagnosis Description Comment Added By Time Added    Aspiration pneumonia, unspecified aspiration pneumonia type, unspecified laterality, unspecified part of lung (H) [J69.0] Aspiration pneumonia, unspecified aspiration pneumonia type, unspecified laterality, unspecified part of lung (H) [J69.0]  Casey Kothari MD 2018  7:49 PM    COPD exacerbation (H) [J44.1] COPD exacerbation (H) [J44.1]  Casey Kothari MD 2018  7:49 PM    Dehydration [E86.0] Dehydration [E86.0]  Casey Kothari MD 2018  7:49 PM    Hypoglycemia [E16.2] Hypoglycemia [E16.2]  Casey Kothari MD 2018  7:49 PM      Hospital Problems as of 2018              Priority Class Noted POA    Infantile cerebral palsy (H) Medium  Unknown Yes    Epilepsy (H) Medium  Unknown Yes    Mental retardation Medium  Unknown Yes    Esophageal reflux Medium  10/31/2001 Yes    Dysphagia Medium  10/2/2011 Yes    * (Principal)Pneumonia due to infectious organism Medium  2018 Yes    Sepsis (H) - fever, tachypnea, tachycardia Medium  2/15/2018 Yes    Acute respiratory failure with hypoxia (H) Medium  2/15/2018 Yes      Non-Hospital Problems as of 2018              Priority Class Noted    Osteoarthritis Medium  Unknown    Diaphragmatic hernia Medium  Unknown    Essential and " other specified forms of tremor Medium  Unknown    Idiopathic interstitial pneumonia (H) Medium  10/31/2001    History of colon cancer Medium  2/4/2010    Osteopenia Medium  9/13/2010    CARDIOVASCULAR SCREENING; LDL GOAL LESS THAN 130 Medium  10/31/2010    Chronic rhinitis Medium  2/8/2011    Chronic constipation Medium  10/2/2011    Advance Care Planning Medium  10/11/2011    Insomnia Medium  2/24/2012    Urine incontinence Medium  2/24/2012    Psychogenic cough Medium  4/26/2012    Fibrosis of lung (H) Medium  5/24/2012    Recurrent UTI Medium  5/24/2012    Weight loss Medium  6/1/2012    Spinal stenosis Medium  9/24/2013    Health Care Home Medium  11/25/2014      Code Status History     Date Active Date Inactive Code Status Order ID Comments User Context    6/4/2012  8:30 AM 2/14/2018 11:09 PM DNR/DNI [id number]  [facility]         Medication Review      START taking        Dose / Directions Comments    amoxicillin-clavulanate 875-125 MG per tablet   Commonly known as:  AUGMENTIN   Indication:  Pneumonia caused by Inhaling a Substance Into the Lungs, Healthcare-Associated Pneumonia   Used for:  Aspiration pneumonia, unspecified aspiration pneumonia type, unspecified laterality, unspecified part of lung (H)        Dose:  1 tablet   Take 1 tablet by mouth every 12 hours for 7 doses   Quantity:  7 tablet   Refills:  0        ipratropium - albuterol 0.5 mg/2.5 mg/3 mL 0.5-2.5 (3) MG/3ML neb solution   Commonly known as:  DUONEB   Used for:  COPD exacerbation (H)        Dose:  3 mL   Take 1 vial (3 mLs) by nebulization every 6 hours   Quantity:  360 mL   Refills:  0        predniSONE 20 MG tablet   Commonly known as:  DELTASONE   Used for:  COPD exacerbation (H)        Dose:  40 mg   Start taking on:  2/21/2018   Take 2 tablets (40 mg) by mouth daily x4 days, then 1 tablet daily x4 days, then 0.5 tab daily x4 days, then stop   Quantity:  14 tablet   Refills:  0        sulfamethoxazole-trimethoprim  800-160 MG per tablet   Commonly known as:  BACTRIM DS/SEPTRA DS   Indication:  Pneumonia caused by Inhaling a Substance Into the Lungs, Healthcare-Associated Pneumonia        Dose:  1 tablet   Take 1 tablet by mouth 2 times daily for 7 doses   Quantity:  7 tablet   Refills:  0          CONTINUE these medications which have NOT CHANGED        Dose / Directions Comments    acetaminophen 325 MG tablet   Commonly known as:  TYLENOL        Dose:  325-650 mg   Take 325-650 mg by mouth   Refills:  0        albuterol (2.5 MG/3ML) 0.083% neb solution   Used for:  Wheezing        Dose:  1 ampule   Take 3 mLs (2.5 mg) by nebulization every 6 hours as needed for shortness of breath / dyspnea   Quantity:  30 vial   Refills:  0        BIOTENE PBF DRY MOUTH Pste        use twice daily   Refills:  0        bismuth subsalicylate 262 MG/15ML suspension   Commonly known as:  PEPTO BISMOL        Dose:  15-30 mL   Take 15-30 mLs by mouth   Refills:  0        BUTT PASTE - REGULAR   Commonly known as:  DR VIANEY VAUGHNOP BUTT PASTE FORMULA   Used for:  Open wound of sacroiliac region without complication        Apply topically 2 times daily Apply to buttocks wound BID and prn.   Quantity:  16 oz   Refills:  1        DEPAKOTE SPRINKLES 125 MG capsule   Generic drug:  divalproex        2 CAPSULES three times DAILY   Refills:  0        docosanol 10 % Crea cream   Commonly known as:  ABREVA        Dose:  1 Application   Apply 1 Application topically   Refills:  0        EAR DROPS 6.5 % otic solution   Generic drug:  carbamide peroxide        Dose:  5-10 drop   Place 5-10 drops in ear(s)   Refills:  0        loratadine 10 MG tablet   Commonly known as:  CLARITIN        Refills:  0        MELATONIN PO        Refills:  0        MIRALAX powder   Generic drug:  polyethylene glycol        Dose:  1 capful   Take 17 g by mouth daily.   Quantity:  510 g   Refills:  1        nystatin cream   Commonly known as:  MYCOSTATIN        Dose:  1 Application    Apply 1 Application topically   Refills:  0        * order for DME   Used for:  Infantile cerebral palsy, unspecified, Unspecified epilepsy without mention of intractable epilepsy, Unspecified intellectual disabilities, Osteoarthrosis, unspecified whether generalized or localized, unspecified site, Essential and other specified forms of tremor, Osteopenia, Spinal stenosis        Equipment being ordered: bed rails   Quantity:  1 Device   Refills:  0        * order for DME   Used for:  Wheezing, SOB (shortness of breath)        Equipment being ordered: nebulizer mask   Quantity:  2 Device   Refills:  1        * order for DME   Used for:  Buttock wound, right, initial encounter, Buttock wound, left, initial encounter        Equipment being ordered: Interdry Roll   Quantity:  1 Box   Refills:  3        * order for DME   Used for:  Buttock wound, right, initial encounter, Buttock wound, left, initial encounter        Equipment being ordered: Interdry roll.  Apply to buttock crease daily and PRN   Quantity:  1 Box   Refills:  3        * order for DME   Used for:  Buttock wound, right, initial encounter, Buttock wound, left, initial encounter        Equipment being ordered: 4x6 tegaderm  - Apply to buttock daily   Quantity:  1 Box   Refills:  3        * order for DME   Used for:  Buttock wound, right, initial encounter, Buttock wound, left, initial encounter        Equipment being ordered: Nu skin - apply to sacral area daily and prn   Quantity:  1 Box   Refills:  3        * order for DME        coloplast Critic Aid skin paste, thick moisture barrier paste, apply to buttocks twice daily and as needed.   Refills:  0        RA CALAMINE 6.971-6.971 % Lotn        Dose:  1 Application   Apply 1 Application topically   Refills:  0        THICK-IT #2 PO        Add to all liquids   Refills:  0        * Notice:  This list has 7 medication(s) that are the same as other medications prescribed for you. Read the directions carefully,  and ask your doctor or other care provider to review them with you.            Summary of Visit     Reason for your hospital stay       Pneumonia and suspected COPD exacerbation which has improved with the steroids, antibiotics, and nebs given during this hospital stay.  Mickie will continue with the antibiotics (Augmentin and Bactrim), steroids (prednisone) and nebs (scheduled Duonebs and as needed albuterol nebs) as she returns to the group home.             After Care     Activity       Your activity upon discharge: activity as tolerated       Diet       Follow this diet upon discharge: Dysphagia Diet Level 1: Pureed; Pudding/Spoon Thick Liquids (use instant food thickener)             Follow-Up Appointment Instructions     Future Labs/Procedures    Follow-up and recommended labs and tests      Comments:    Follow up with primary care provider, Indira Bobo, within 7 days for hospital follow- up.      Follow-Up Appointment Instructions     Follow-up and recommended labs and tests        Follow up with primary care provider, Indira Bobo, within 7 days for hospital follow- up.             Statement of Approval     Ordered          02/20/18 1028  I have reviewed and agree with all the recommendations and orders detailed in this document.  EFFECTIVE NOW     Approved and electronically signed by:  Adelina Miller MD

## 2018-02-14 NOTE — IP AVS SNAPSHOT
MRN:[id number]                      After Visit Summary   2/14/2018    Mickie Landin    MRN: [id number]           Thank you!     Thank you for choosing [facility] for your care. Our goal is always to provide you with excellent care. Hearing back from our patients is one way we can continue to improve our services. Please take a few minutes to complete the written survey that you may receive in the mail after you visit with us. Thank you!        Patient Information     Date Of Birth          1940        Designated Caregiver       Most Recent Value    Caregiver    Will someone help with your care after discharge? yes    Name of designated caregiver Ismael    Phone number of caregiver [phone]    Caregiver address [place]      About your hospital stay     You were admitted on:  February 14, 2018 You last received care in the:  [address]    You were discharged on:  February 20, 2018        Reason for your hospital stay       Pneumonia and suspected COPD exacerbation which has improved with the steroids, antibiotics, and nebs given during this hospital stay.  Mickie will continue with the antibiotics (Augmentin and Bactrim), steroids (prednisone) and nebs (scheduled Duonebs and as needed albuterol nebs) as she returns to the group home.                  Who to Call     For medical emergencies, please call 911.  For non-urgent questions about your medical care, please call your primary care provider or clinic, [phone]          Attending Provider     Provider Specialty    Casey Kothari MD Emergency Medicine    [place], Nicholas Perry MD [facility]    Adelina Miller MD [facility]       Primary Care Provider Office Phone # Fax #    Indira Choudhary PA-C [phone]      After Care Instructions     Activity       Your activity upon discharge: activity as tolerated            Diet       Follow this diet upon discharge:  "Dysphagia Diet Level 1: Pureed; Pudding/Spoon Thick Liquids (use instant food thickener)                  Follow-up Appointments     Follow-up and recommended labs and tests        Follow up with primary care provider, Indira Bobo, within 7 days for hospital follow- up.                  Pending Results     Date and Time Order Name Status Description    2018 0845 Blood culture In process     2018 0845 Blood culture In process             Statement of Approval     Ordered          18 1028  I have reviewed and agree with all the recommendations and orders detailed in this document.  EFFECTIVE NOW     Approved and electronically signed by:  Adelina Miller MD             Admission Information     Date & Time Provider Department Dept. Phone    2018 Adelina Miller MD [address] [phone]      Your Vitals Were     Blood Pressure Pulse Temperature Respirations Height Weight    155/69 89 99.4  F (37.4  C) (Oral) 20 1.626 m (5' 4\") 53.5 kg (117 lb 15.1 oz)    Pulse Oximetry BMI (Body Mass Index)                90% 20.25 kg/m2          MyChart Information     [facility] lets you send messages to your doctor, view your test results, renew your prescriptions, schedule appointments and more. To sign up, go to [email or web address] . Click on \"Log in\" on the left side of the screen, which will take you to the Welcome page. Then click on \"Sign up Now\" on the right side of the page.     You will be asked to enter the access code listed below, as well as some personal information. Please follow the directions to create your username and password.     Your access code is: [email or web address]  Expires: 2018 10:42 AM     Your access code will  in 90 days. If you need help or a new code, please call your [facility] clinic or [phone].        Care EveryWhere ID     This is your Care EveryWhere ID. This could be used by other organizations to access " your [facility] medical records  [id number]        Equal Access to Services     DA MCNAIR : Hadii aad ku hadgauravsj Sokatrin, waaxda luqadaha, qaybta kaalmada varunsofyleo, [place] beyerterryginna conrad. So [facility] [phone].    ATENCIÓN: Si habla español, tiene a day disposición servicios gratuitos de asistencia lingüística. Llame al [phone].    We comply with applicable federal civil rights laws and [place] laws. We do not discriminate on the basis of race, color, national origin, age, disability, sex, sexual orientation, or gender identity.               Review of your medicines      START taking        Dose / Directions    amoxicillin-clavulanate 875-125 MG per tablet   Commonly known as:  AUGMENTIN   Indication:  Pneumonia caused by Inhaling a Substance Into the Lungs, Healthcare-Associated Pneumonia   Used for:  Aspiration pneumonia, unspecified aspiration pneumonia type, unspecified laterality, unspecified part of lung (H)        Dose:  1 tablet   Take 1 tablet by mouth every 12 hours for 7 doses   Quantity:  7 tablet   Refills:  0       ipratropium - albuterol 0.5 mg/2.5 mg/3 mL 0.5-2.5 (3) MG/3ML neb solution   Commonly known as:  DUONEB   Used for:  COPD exacerbation (H)        Dose:  3 mL   Take 1 vial (3 mLs) by nebulization every 6 hours   Quantity:  360 mL   Refills:  0       predniSONE 20 MG tablet   Commonly known as:  DELTASONE   Used for:  COPD exacerbation (H)        Dose:  40 mg   Start taking on:  2/21/2018   Take 2 tablets (40 mg) by mouth daily x4 days, then 1 tablet daily x4 days, then 0.5 tab daily x4 days, then stop   Quantity:  14 tablet   Refills:  0       sulfamethoxazole-trimethoprim 800-160 MG per tablet   Commonly known as:  BACTRIM DS/SEPTRA DS   Indication:  Pneumonia caused by Inhaling a Substance Into the Lungs, Healthcare-Associated Pneumonia        Dose:  1 tablet   Take 1 tablet by mouth 2 times daily for 7 doses   Quantity:  7 tablet   Refills:  0          CONTINUE these medicines which have NOT CHANGED        Dose / Directions    acetaminophen 325 MG tablet   Commonly known as:  TYLENOL        Dose:  325-650 mg   Take 325-650 mg by mouth   Refills:  0       albuterol (2.5 MG/3ML) 0.083% neb solution   Used for:  Wheezing        Dose:  1 ampule   Take 3 mLs (2.5 mg) by nebulization every 6 hours as needed for shortness of breath / dyspnea   Quantity:  30 vial   Refills:  0       BIOTENE PBF DRY MOUTH Pste        use twice daily   Refills:  0       bismuth subsalicylate 262 MG/15ML suspension   Commonly known as:  PEPTO BISMOL        Dose:  15-30 mL   Take 15-30 mLs by mouth   Refills:  0       BUTT PASTE - REGULAR   Commonly known as:  DR VIANEY CALZADA GOOP BUTT PASTE FORMULA   Used for:  Open wound of sacroiliac region without complication        Apply topically 2 times daily Apply to buttocks wound BID and prn.   Quantity:  16 oz   Refills:  1       DEPAKOTE SPRINKLES 125 MG capsule   Generic drug:  divalproex        2 CAPSULES three times DAILY   Refills:  0       docosanol 10 % Crea cream   Commonly known as:  ABREVA        Dose:  1 Application   Apply 1 Application topically   Refills:  0       EAR DROPS 6.5 % otic solution   Generic drug:  carbamide peroxide        Dose:  5-10 drop   Place 5-10 drops in ear(s)   Refills:  0       loratadine 10 MG tablet   Commonly known as:  CLARITIN        Refills:  0       MELATONIN PO        Refills:  0       MIRALAX powder   Generic drug:  polyethylene glycol        Dose:  1 capful   Take 17 g by mouth daily.   Quantity:  510 g   Refills:  1       nystatin cream   Commonly known as:  MYCOSTATIN        Dose:  1 Application   Apply 1 Application topically   Refills:  0       * order for DME   Used for:  Infantile cerebral palsy, unspecified, Unspecified epilepsy without mention of intractable epilepsy, Unspecified intellectual disabilities, Osteoarthrosis, unspecified whether generalized or localized, unspecified site, Essential  and other specified forms of tremor, Osteopenia, Spinal stenosis        Equipment being ordered: bed rails   Quantity:  1 Device   Refills:  0       * order for DME   Used for:  Wheezing, SOB (shortness of breath)        Equipment being ordered: nebulizer mask   Quantity:  2 Device   Refills:  1       * order for DME   Used for:  Buttock wound, right, initial encounter, Buttock wound, left, initial encounter        Equipment being ordered: Interdry Roll   Quantity:  1 Box   Refills:  3       * order for DME   Used for:  Buttock wound, right, initial encounter, Buttock wound, left, initial encounter        Equipment being ordered: Interdry roll.  Apply to buttock crease daily and PRN   Quantity:  1 Box   Refills:  3       * order for DME   Used for:  Buttock wound, right, initial encounter, Buttock wound, left, initial encounter        Equipment being ordered: 4x6 tegaderm  - Apply to buttock daily   Quantity:  1 Box   Refills:  3       * order for DME   Used for:  Buttock wound, right, initial encounter, Buttock wound, left, initial encounter        Equipment being ordered: Nu skin - apply to sacral area daily and prn   Quantity:  1 Box   Refills:  3       * order for DME        coloplast Critic Aid skin paste, thick moisture barrier paste, apply to buttocks twice daily and as needed.   Refills:  0       RA CALAMINE 6.971-6.971 % Lotn        Dose:  1 Application   Apply 1 Application topically   Refills:  0       THICK-IT #2 PO        Add to all liquids   Refills:  0       * Notice:  This list has 7 medication(s) that are the same as other medications prescribed for you. Read the directions carefully, and ask your doctor or other care provider to review them with you.         Where to get your medicines      These medications were sent to [facility] - [place], [place] - [address]  [address] [facility] [address]     Phone:  [phone]     amoxicillin-clavulanate 875-125 MG per  tablet    ipratropium - albuterol 0.5 mg/2.5 mg/3 mL 0.5-2.5 (3) MG/3ML neb solution    predniSONE 20 MG tablet    sulfamethoxazole-trimethoprim 800-160 MG per tablet                Protect others around you: Learn how to safely use, store and throw away your medicines at www.disposemymeds.org.        ANTIBIOTIC INSTRUCTION     You've Been Prescribed an Antibiotic - Now What?  Your healthcare team thinks that you or your loved one might have an infection. Some infections can be treated with antibiotics, which are powerful, life-saving drugs. Like all medications, antibiotics have side effects and should only be used when necessary. There are some important things you should know about your antibiotic treatment.      Your healthcare team may run tests before you start taking an antibiotic.    Your team may take samples (e.g., from your blood, urine or other areas) to run tests to look for bacteria. These test can be important to determine if you need an antibiotic at all and, if you do, which antibiotic will work best.      Within a few days, your healthcare team might change or even stop your antibiotic.    Your team may start you on an antibiotic while they are working to find out what is making you sick.    Your team might change your antibiotic because test results show that a different antibiotic would be better to treat your infection.    In some cases, once your team has more information, they learn that you do not need an antibiotic at all. They may find out that you don't have an infection, or that the antibiotic you're taking won't work against your infection. For example, an infection caused by a virus can't be treated with antibiotics. Staying on an antibiotic when you don't need it is more likely to be harmful than helpful.      You may experience side effects from your antibiotic.    Like all medications, antibiotics have side effects. Some of these can be serious.    Let you healthcare team know if you  have any known allergies when you are admitted to the hospital.    One significant side effect of nearly all antibiotics is the risk of severe and sometimes deadly diarrhea caused by Clostridium difficile (C. Difficile). This occurs when a person takes antibiotics because some good germs are destroyed. Antibiotic use allows C. diificile to take over, putting patients at high risk for this serious infection.    As a patient or caregiver, it is important to understand your or your loved one's antibiotic treatment. It is especially important for caregivers to speak up when patients can't speak for themselves. Here are some important questions to ask your healthcare team.    What infection is this antibiotic treating and how do you know I have that infection?    What side effects might occur from this antibiotic?    How long will I need to take this antibiotic?    Is it safe to take this antibiotic with other medications or supplements (e.g., vitamins) that I am taking?     Are there any special directions I need to know about taking this antibiotic? For example, should I take it with food?    How will I be monitored to know whether my infection is responding to the antibiotic?    What tests may help to make sure the right antibiotic is prescribed for me?      Information provided by:  www.cdc.gov/getsmart  U.S. Department of Health and Human Services  Centers for disease Control and Prevention  National Center for Emerging and Zoonotic Infectious Diseases  Division of Healthcare Quality Promotion             Medication List: This is a list of all your medications and when to take them. Check marks below indicate your daily home schedule. Keep this list as a reference.      Medications           Morning Afternoon Evening Bedtime As Needed    acetaminophen 325 MG tablet   Commonly known as:  TYLENOL   Take 325-650 mg by mouth                                albuterol (2.5 MG/3ML) 0.083% neb solution   Take 3 mLs (2.5 mg)  by nebulization every 6 hours as needed for shortness of breath / dyspnea   Last time this was given:  2.5 mg on 2/16/2018  5:17 AM                                amoxicillin-clavulanate 875-125 MG per tablet   Commonly known as:  AUGMENTIN   Take 1 tablet by mouth every 12 hours for 7 doses   Last time this was given:  1 tablet on 2/20/2018  9:09 AM                                BIOTENE PBF DRY MOUTH Pste   use twice daily                                bismuth subsalicylate 262 MG/15ML suspension   Commonly known as:  PEPTO BISMOL   Take 15-30 mLs by mouth                                BUTT PASTE - REGULAR   Commonly known as:  DR VIANEY CALZADA GOOP BUTT PASTE FORMULA   Apply topically 2 times daily Apply to buttocks wound BID and prn.                                DEPAKOTE SPRINKLES 125 MG capsule   2 CAPSULES three times DAILY   Generic drug:  divalproex                                docosanol 10 % Crea cream   Commonly known as:  ABREVA   Apply 1 Application topically                                EAR DROPS 6.5 % otic solution   Place 5-10 drops in ear(s)   Generic drug:  carbamide peroxide                                ipratropium - albuterol 0.5 mg/2.5 mg/3 mL 0.5-2.5 (3) MG/3ML neb solution   Commonly known as:  DUONEB   Take 1 vial (3 mLs) by nebulization every 6 hours   Last time this was given:  3 mLs on 2/20/2018 11:30 AM                                loratadine 10 MG tablet   Commonly known as:  CLARITIN                                MELATONIN PO                                MIRALAX powder   Take 17 g by mouth daily.   Generic drug:  polyethylene glycol                                nystatin cream   Commonly known as:  MYCOSTATIN   Apply 1 Application topically                                * order for DME   Equipment being ordered: bed rails                                * order for DME   Equipment being ordered: nebulizer mask                                * order for DME   Equipment being  ordered: Interdry Roll                                * order for DME   Equipment being ordered: Interdry roll.  Apply to buttock crease daily and PRN                                * order for DME   Equipment being ordered: 4x6 tegaderm  - Apply to buttock daily                                * order for DME   Equipment being ordered: Nu skin - apply to sacral area daily and prn                                * order for DME   coloplast Critic Aid skin paste, thick moisture barrier paste, apply to buttocks twice daily and as needed.                                predniSONE 20 MG tablet   Commonly known as:  DELTASONE   Take 2 tablets (40 mg) by mouth daily x4 days, then 1 tablet daily x4 days, then 0.5 tab daily x4 days, then stop   Start taking on:  2/21/2018   Last time this was given:  40 mg on 2/20/2018  9:09 AM                                RA CALAMINE 6.971-6.971 % Lotn   Apply 1 Application topically                                sulfamethoxazole-trimethoprim 800-160 MG per tablet   Commonly known as:  BACTRIM DS/SEPTRA DS   Take 1 tablet by mouth 2 times daily for 7 doses   Last time this was given:  1 tablet on 2/20/2018  9:09 AM                                THICK-IT #2 PO   Add to all liquids                                * Notice:  This list has 7 medication(s) that are the same as other medications prescribed for you. Read the directions carefully, and ask your doctor or other care provider to review them with you.

## 2018-02-14 NOTE — ED NOTES
Pt lives in group home. Care giver [place] states pt had been on Hospice but recently taken off so she has not been on 02. Pt was Trung  to bed with 2 assist.

## 2018-02-14 NOTE — ED NOTES
"Patient had chest xray at group home today. Report sent with patient to ED. Her Caregiver states patient was started on Levofloxacin for \"aspiration pneumonia\".  sats at group home were 80's  "

## 2018-02-15 PROBLEM — A41.9 SEPSIS (H): Status: ACTIVE | Noted: 2018-02-15

## 2018-02-15 PROBLEM — J96.01 ACUTE RESPIRATORY FAILURE WITH HYPOXIA (H): Status: ACTIVE | Noted: 2018-02-15

## 2018-02-15 LAB
ANION GAP SERPL CALCULATED.3IONS-SCNC: 10 MMOL/L (ref 3–14)
BUN SERPL-MCNC: 13 MG/DL (ref 7–30)
CALCIUM SERPL-MCNC: 8.4 MG/DL (ref 8.5–10.1)
CHLORIDE SERPL-SCNC: 100 MMOL/L (ref 94–109)
CO2 SERPL-SCNC: 28 MMOL/L (ref 20–32)
CREAT SERPL-MCNC: 0.63 MG/DL (ref 0.52–1.04)
ERYTHROCYTE [DISTWIDTH] IN BLOOD BY AUTOMATED COUNT: 14.7 % (ref 10–15)
FLUAV H1 2009 PAND RNA SPEC QL NAA+PROBE: NEGATIVE
FLUAV H1 RNA SPEC QL NAA+PROBE: NEGATIVE
FLUAV H3 RNA SPEC QL NAA+PROBE: NEGATIVE
FLUAV RNA SPEC QL NAA+PROBE: NEGATIVE
FLUBV RNA SPEC QL NAA+PROBE: NEGATIVE
GFR SERPL CREATININE-BSD FRML MDRD: >90 ML/MIN/1.7M2
GLUCOSE SERPL-MCNC: 135 MG/DL (ref 70–99)
HADV DNA SPEC QL NAA+PROBE: NEGATIVE
HADV DNA SPEC QL NAA+PROBE: NEGATIVE
HCT VFR BLD AUTO: 42 % (ref 35–47)
HGB BLD-MCNC: 13.6 G/DL (ref 11.7–15.7)
HMPV RNA SPEC QL NAA+PROBE: NEGATIVE
HPIV1 RNA SPEC QL NAA+PROBE: NEGATIVE
HPIV2 RNA SPEC QL NAA+PROBE: NEGATIVE
HPIV3 RNA SPEC QL NAA+PROBE: NEGATIVE
LACTATE BLD-SCNC: 1.1 MMOL/L (ref 0.7–2)
MCH RBC QN AUTO: 30.6 PG (ref 26.5–33)
MCHC RBC AUTO-ENTMCNC: 32.4 G/DL (ref 31.5–36.5)
MCV RBC AUTO: 94 FL (ref 78–100)
MICROBIOLOGIST REVIEW: NORMAL
PLATELET # BLD AUTO: 190 10E9/L (ref 150–450)
POTASSIUM SERPL-SCNC: 5.1 MMOL/L (ref 3.4–5.3)
RBC # BLD AUTO: 4.45 10E12/L (ref 3.8–5.2)
RHINOVIRUS RNA SPEC QL NAA+PROBE: NEGATIVE
RSV RNA SPEC QL NAA+PROBE: NEGATIVE
RSV RNA SPEC QL NAA+PROBE: NEGATIVE
SODIUM SERPL-SCNC: 138 MMOL/L (ref 133–144)
SPECIMEN SOURCE: NORMAL
WBC # BLD AUTO: 5.3 10E9/L (ref 4–11)

## 2018-02-15 PROCEDURE — 25000132 ZZH RX MED GY IP 250 OP 250 PS 637: Performed by: FAMILY MEDICINE

## 2018-02-15 PROCEDURE — 40000275 ZZH STATISTIC RCP TIME EA 10 MIN

## 2018-02-15 PROCEDURE — 25000125 ZZHC RX 250: Performed by: FAMILY MEDICINE

## 2018-02-15 PROCEDURE — 40000274 ZZH STATISTIC RCP CONSULT EA 30 MIN

## 2018-02-15 PROCEDURE — 40000809 ZZH STATISTIC NO DOCUMENTATION TO SUPPORT CHARGE

## 2018-02-15 PROCEDURE — 36415 COLL VENOUS BLD VENIPUNCTURE: CPT | Performed by: FAMILY MEDICINE

## 2018-02-15 PROCEDURE — 83605 ASSAY OF LACTIC ACID: CPT | Performed by: FAMILY MEDICINE

## 2018-02-15 PROCEDURE — 99233 SBSQ HOSP IP/OBS HIGH 50: CPT | Performed by: PEDIATRICS

## 2018-02-15 PROCEDURE — 25000128 H RX IP 250 OP 636: Performed by: FAMILY MEDICINE

## 2018-02-15 PROCEDURE — 25000132 ZZH RX MED GY IP 250 OP 250 PS 637: Performed by: PEDIATRICS

## 2018-02-15 PROCEDURE — 12000000 ZZH R&B MED SURG/OB

## 2018-02-15 PROCEDURE — 85027 COMPLETE CBC AUTOMATED: CPT | Performed by: FAMILY MEDICINE

## 2018-02-15 PROCEDURE — 80048 BASIC METABOLIC PNL TOTAL CA: CPT | Performed by: FAMILY MEDICINE

## 2018-02-15 PROCEDURE — 94640 AIRWAY INHALATION TREATMENT: CPT

## 2018-02-15 PROCEDURE — 40000270 ZZH STATISTIC OXYGEN  O2DAILY TECH TIME

## 2018-02-15 PROCEDURE — 94640 AIRWAY INHALATION TREATMENT: CPT | Mod: 76

## 2018-02-15 RX ADMIN — IPRATROPIUM BROMIDE AND ALBUTEROL SULFATE 3 ML: .5; 3 SOLUTION RESPIRATORY (INHALATION) at 09:09

## 2018-02-15 RX ADMIN — VANCOMYCIN HYDROCHLORIDE 1000 MG: 1 INJECTION, SOLUTION INTRAVENOUS at 10:01

## 2018-02-15 RX ADMIN — VALPROIC ACID 250 MG: 250 SOLUTION ORAL at 23:01

## 2018-02-15 RX ADMIN — TAZOBACTAM SODIUM AND PIPERACILLIN SODIUM 3.38 G: 375; 3 INJECTION, SOLUTION INTRAVENOUS at 04:06

## 2018-02-15 RX ADMIN — VANCOMYCIN HYDROCHLORIDE 1000 MG: 1 INJECTION, SOLUTION INTRAVENOUS at 20:35

## 2018-02-15 RX ADMIN — TAZOBACTAM SODIUM AND PIPERACILLIN SODIUM 3.38 G: 375; 3 INJECTION, SOLUTION INTRAVENOUS at 11:14

## 2018-02-15 RX ADMIN — METHYLPREDNISOLONE SODIUM SUCCINATE 62.5 MG: 125 INJECTION, POWDER, FOR SOLUTION INTRAMUSCULAR; INTRAVENOUS at 20:35

## 2018-02-15 RX ADMIN — VALPROIC ACID 250 MG: 250 SOLUTION ORAL at 17:15

## 2018-02-15 RX ADMIN — IPRATROPIUM BROMIDE AND ALBUTEROL SULFATE 3 ML: .5; 3 SOLUTION RESPIRATORY (INHALATION) at 20:35

## 2018-02-15 RX ADMIN — TAZOBACTAM SODIUM AND PIPERACILLIN SODIUM 3.38 G: 375; 3 INJECTION, SOLUTION INTRAVENOUS at 15:23

## 2018-02-15 RX ADMIN — ALBUTEROL SULFATE 2.5 MG: 2.5 SOLUTION RESPIRATORY (INHALATION) at 00:00

## 2018-02-15 RX ADMIN — IPRATROPIUM BROMIDE AND ALBUTEROL SULFATE 3 ML: .5; 3 SOLUTION RESPIRATORY (INHALATION) at 03:54

## 2018-02-15 RX ADMIN — TAZOBACTAM SODIUM AND PIPERACILLIN SODIUM 3.38 G: 375; 3 INJECTION, SOLUTION INTRAVENOUS at 23:01

## 2018-02-15 RX ADMIN — METHYLPREDNISOLONE SODIUM SUCCINATE 62.5 MG: 125 INJECTION, POWDER, FOR SOLUTION INTRAMUSCULAR; INTRAVENOUS at 10:00

## 2018-02-15 RX ADMIN — IPRATROPIUM BROMIDE AND ALBUTEROL SULFATE 3 ML: .5; 3 SOLUTION RESPIRATORY (INHALATION) at 14:43

## 2018-02-15 RX ADMIN — ONDANSETRON 4 MG: 2 INJECTION INTRAMUSCULAR; INTRAVENOUS at 00:06

## 2018-02-15 RX ADMIN — SODIUM CHLORIDE: 9 INJECTION, SOLUTION INTRAVENOUS at 18:33

## 2018-02-15 NOTE — PLAN OF CARE
Problem: Patient Care Overview  Goal: Plan of Care/Patient Progress Review  Outcome: No Change  Max temp 103.5 axillary, recheck 97.9 axillary. Telemetry showing sinus rhythm/sinus tach. Pt flagged for Lactic. Lactic was WNL at  1.1. O2 sats mid to upper 90's on 3.5L/NC while sleeping, when awake pt is agitated and takes rapid shallow breaths. O2 sats while awake low 80's-low 90's on 4-5L/NC. HS Depakote held (MD aware). Depakote is long acting and unable to crush. When attempting honey thickened liquids pt began to cough, gag and choke on thickened liquids, pt needed to be suctioned to clear the liquids.

## 2018-02-15 NOTE — ED NOTES
ED Nursing criteria listed below was addressed during verbal handoff:     Abnormal vitals: Yes  Abnormal results: Yes  Med Reconciliation completed: Yes  Meds given in ED: Yes  Any Overdue Meds: Yes ( needs zosyn after vanco)  Core Measures: Yes  Isolation: Yes ( droplet)  Special needs: Yes ( fall risk, needs thickened liquids, pulls at O2, may need wrist restraints, non verbal)  Skin assessment: Yes    Observation Patient  Education provided: N/A

## 2018-02-15 NOTE — H&P
[facility]    History and Physical  Hospitalist       Date of Admission:  2/14/2018    Assessment & Plan   Mickie Landin is a 78 year old female who presents with worsening respiratory status.  She comes from a group home with staff there noting that she was having increased cough and wheezing this morning with oxygen saturations dropping into the low 80% range.  Chest x-ray suggested a left-sided infiltrate and Levaquin as ordered but because of worsening symptoms, she was transferred to the ER for evaluation.  At the nursing home she had a fever of 100.7  on arrival to the increased respiratory rate with white count of 5700 normal electrolytes, blood gas with some slight hypercapnia and chest x-ray showing some left lower lobe atelectasis versus possible fibrosis versus infiltrate.  Flu testing was negative.  Because of her underlying neurological condition of cerebral palsy and mental retardation, there is some concern whether she potentially could have aspirated.  Patient will be admitted to inpatient status for treatment of probable pneumonia and will be treated with Zosyn and vancomycin to cover broad-spectrum organisms with cultures of blood pending.  She is wheezing in the emergency department requiring nebulization treatments and has been given steroids.  It may be that this is all a viral process,, so viral PCR has been ordered.  Expect she will be in the hospital for at least 2 days.    Active Problems:    Pneumonia due to infectious organism    Assessment: Presenting with increased cough, fever, wheezing with x-ray suggesting possible left lower lobe infiltrate.  She is a group home resident with risk factors for possible aspiration.  This is concerning for possible broad-spectrum organism.    Plan: Broad-spectrum antibiotics with cultures pending.  Consider also viral process we will order viral PCR studies.    Infantile cerebral palsy (H)    Assessment: Chronic issues,  nonverbal    Plan: Supportive care    Epilepsy (H)    Assessment: No current symptoms, taking Depakote    Plan: Continue Depakote    Mental retardation    Assessment: No change, nonverbal    Plan: Supportive cares    Esophageal reflux    Assessment: Noted in past, taking Prilosec    Plan: Continue  # Pain Assessment:   Current Pain Score 2/14/2018   Patient currently in pain? denies   Pain score (0-10) 0   - Mickie is unable to participate in a collaborative plan for pain management due to mental retardation and cerebral palsy, nonverbal.   - Pharmacologic adjuvants: Acetaminophen, has been on narcotics in the past, not sure if she still on these, try to get old records    DVT Prophylaxis: Pneumatic Compression Devices  Code Status: DNR / DNI per previous POLST    Disposition: Expected discharge in 2 days once feeling better.    Nicholas Fuller MD    Primary Care Physician   Indira Bobo    Chief Complaint   78-year-old female from group home here with increased cough and wheeze    History is obtained from the electronic health record and emergency department physician    History of Present Illness   Mickie Landni is a 78 year old female who presents with increased cough and wheeze.  She is a nonverbal patient with history of cerebral palsy and mental retardation presenting from a group home with increased cough and wheezing.  This is noted by staff this morning with oxygen saturations low at the low 80s Chest x-ray was done which was evidently showing some type of left lower lobe infiltrate and she was ordered to take Levaquin.  Because of worsening status, she was transferred to the emergency department.  Because of her underlying mental retardation, she is unable to give any history whatsoever.    Past Medical History    I have reviewed this patient's medical history and updated it with pertinent information if needed.   Past Medical History:   Diagnosis Date     Constipation      Contusion of thigh  08/10/09     Diaphragmatic hernia without mention of obstruction or gangrene     Hiatal hernia     Epilepsy (H)      Essential and other specified forms of tremor      Infantile cerebral palsy, unspecified      Malignant neoplasm of descending colon (H) 01/23/06    Admit. Discharged 01/29/06     Osteoarthrosis, unspecified whether generalized or localized, unspecified site     Osteoarthritis     Pica      Pneumonia, organism unspecified(486) 01/31/09     Rectal bleeding 6/1/2012     Stricture and stenosis of esophagus      Ulcer of ankle (H) 4/28/2011     Unspecified disease of respiratory system     Pulmonary fibrosis     Unspecified intellectual disabilities     Mental retardation     Urinary tract infection, site not specified     Recurrent       Past Surgical History   I have reviewed this patient's surgical history and updated it with pertinent information if needed.  Past Surgical History:   Procedure Laterality Date     C PART REMOVAL COLON W ANASTOMOSIS  01/23/06    Partial left colectomy with takedown of splenic flexure.     COLONOSCOPY  11/01/2006    Polypectomy     COLONOSCOPY  12/19/2007     COLONOSCOPY  12/19/08     COLONOSCOPY  04/06/10     COLONOSCOPY  4/1/2011    Procedure:COLONOSCOPY; Surgeon:MATT FERRARO; Location:[facility]     HC COLONOSCOPY W/WO BRUSH/WASH  11/23/2005    Polypectomy.       HC TOOTH EXTRACTION W/FORCEP  05/24/2004    Extraction of teeth #3,6,7,8,9,10,11, &12.  Two quadrant aveoloplasty.  F-[facility].       Prior to Admission Medications   Prior to Admission Medications   Prescriptions Last Dose Informant Patient Reported? Taking?   BIOTENE PBF DRY MOUTH DT PSTE More than a month at Unknown time  Yes No   Sig: use twice daily   BUTT PASTE - REGULAR (DR LOVE POOP GOOP BUTT PASTE FORMULA) More than a month at Unknown time  No No   Sig: Apply topically 2 times daily Apply to buttocks wound BID and prn.   Calamine-Zinc Oxide (RA CALAMINE) 6.971-6.971 % LOTN More than a month at Unknown  time  Yes No   Sig: Apply 1 Application topically   DEPAKOTE SPRINKLES 125 MG OR CPSP 2018 at 1200  Yes Yes   Si CAPSULES three times DAILY   MELATONIN PO 2018 at 2000 Care Giver Yes Yes   ORDER FOR DME   No No   Sig: Equipment being ordered: bed rails   ORDER FOR DME   No No   Sig: Equipment being ordered: nebulizer mask   THICK-IT #2 OR 2018 at Unknown time  Yes Yes   Sig: Add to all liquids   acetaminophen (TYLENOL) 325 MG tablet 2018 at 0930  Yes Yes   Sig: Take 325-650 mg by mouth   albuterol (2.5 MG/3ML) 0.083% nebulizer solution 2018 at 1430  No Yes   Sig: Take 3 mLs (2.5 mg) by nebulization every 6 hours as needed for shortness of breath / dyspnea   bismuth subsalicylate (PEPTO BISMOL) 262 MG/15ML suspension More than a month at Unknown time  Yes No   Sig: Take 15-30 mLs by mouth   carbamide peroxide (EAR DROPS) 6.5 % otic solution More than a month at Unknown time  Yes No   Sig: Place 5-10 drops in ear(s)   docosanol (ABREVA) 10 % CREA cream More than a month at Unknown time  Yes No   Sig: Apply 1 Application topically   loratadine (CLARITIN) 10 MG tablet More than a month at Unknown time  Yes No   nystatin (MYCOSTATIN) cream More than a month at Unknown time  Yes No   Sig: Apply 1 Application topically   order for DME   No No   Sig: Equipment being ordered: Interdry Roll   order for DME   No No   Sig: Equipment being ordered: Interdry roll.  Apply to buttock crease daily and PRN   order for DME   No No   Sig: Equipment being ordered: 4x6 tegaderm  - Apply to buttock daily   order for DME   No No   Sig: Equipment being ordered: Nu skin - apply to sacral area daily and prn   order for DME More than a month at Unknown time  Yes No   Sig: coloplast Critic Aid skin paste, thick moisture barrier paste, apply to buttocks twice daily and as needed.   polyethylene glycol (MIRALAX) powder Unknown at Unknown time  Yes No   Sig: Take 17 g by mouth daily.      Facility-Administered  Medications: None     Allergies   Allergies   Allergen Reactions     Baclofen      lethargic       Social History   I have reviewed this patient's social history and updated it with pertinent information if needed. Mickie Landin  reports that she has never smoked. She has never used smokeless tobacco. She reports that she does not drink alcohol or use illicit drugs.    Family History   I have reviewed this patient's family history and updated it with pertinent information if needed.   Family History   Problem Relation Age of Onset     Cancer - colorectal Brother        Review of Systems   Review of systems not obtained due to patient factors - mental status    Physical Exam   Temp: 101  F (38.3  C) Temp src: Rectal BP: (!) 140/94 Pulse: 109   Resp: 26 SpO2: 92 % O2 Device: Nasal cannula Oxygen Delivery: 5 LPM  Vital Signs with Ranges  Temp:  [98.6  F (37  C)-101  F (38.3  C)] 101  F (38.3  C)  Pulse:  [] 109  Resp:  [26-40] 26  BP: ()/(72-98) 140/94  SpO2:  [90 %-96 %] 92 %  117 lbs 15.14 oz    Constitutional: Lying on her side in bed, picking at tubes with somewhat of a tremor  Eyes: Eyes are clear  HEENT: Mouth is dry, no obvious lesions  Respiratory: Bilateral mild wheezing, diminished breath sounds  Cardiovascular: Hard to hear but regular rate, no murmur heard  GI: No obvious tenderness, unable to really fully assess  Lymph/Hematologic: Lymph nodes not enlarged  Genitourinary: Not examined  Skin: No obvious lesions, nursing reports no skin lesions after their more thorough exam.  Musculoskeletal: Sleeping in a fetal position.  Unable to test  Neurologic: Awakens, but unable to cooperate.  Unable to do neurological exam, she does have a tremor.  Psychiatric: Awakens gets jittery but not really responding to any questions    Data   Data reviewed today:  I personally reviewed the chest CT image(s) showing Poor inspiration.  Question atelectasis versus fibrosis in the left lower lobe..    Recent  Labs  Lab 02/14/18  1744   WBC 5.7   HGB 13.0   MCV 96      *   POTASSIUM 4.6   CHLORIDE 92*   CO2 33*   BUN 13   CR 0.55   ANIONGAP 6   JOAO 8.6   GLC 67*   ALBUMIN 2.8*   PROTTOTAL 7.0   BILITOTAL 0.2   ALKPHOS 74   ALT 17   AST 16   TROPI <0.015       Recent Results (from the past 24 hour(s))   XR Chest Port 1 View    Narrative    XR CHEST PORT 1 VW 2/14/2018 6:00 PM    HISTORY: Cough.    COMPARISON: November 22, 2013.      Impression    IMPRESSION: There is linear opacification of the left lateral lung  base, possibly focal atelectasis/scarring or infection. The right lung  is clear. No pleural effusions or pneumothorax. Stable heart and  mediastinum.    BINDU BROWN MD

## 2018-02-15 NOTE — PROGRESS NOTES
[facility]    Hospitalist Progress Note    Date of Service (when I saw the patient): 02/15/2018    Assessment & Plan   Mickie Landin is a 78 year old female who was admitted on 2/14/2018 with suspected pneumonia including fever, cough, and radiographic infiltrate at the left lung base.  Causative organism has not yet been identified, but there is concern for possible aspiration pneumonia based on her known history of dysphagia with aspiration although location at the left lung base is not as typical for aspiration pneumonia.  Her clinical presentation at admission with fever, tachycardia, and tachypnea is consistent with sepsis.  Clinical presentation with increased respiratory effort and severe hypoxia is consistent with hypoxic respiratory failure.  Overall, she appears to be improving.  Clinical evaluation is limited by her underlying severe mental retardation with nonverbal status.  Her caregiver today reinforces overall goals of care for palliative care rather than aggressive intervention as the patient previously had been on hospice even as recently as 2-3 months ago.    Principal Problem:    Pneumonia due to infectious organism  Active Problems:    Mental retardation    Dysphagia    Sepsis (H) - fever, tachypnea, tachycardia    Acute respiratory failure with hypoxia (H)    Infantile cerebral palsy (H)    Epilepsy (H)    Esophageal reflux    Continue empiric Zosyn and vancomycin for now pending additional diagnostic evaluation  Follow-up on viral respiratory panel results and check pro-calcitonin level  Consult swallow therapy for bedside evaluation and possible video swallow if indicated  For now, may continue her usual diet of puréed foods and pudding thickened liquids administering only one spoonful at a time until it has been swallowed and only when the patient is positioned seated upright in a chair  Continue IV fluids for now  Wean oxygen as tolerated  Discontinue  telemetry  Bedside attendant if needed because she has been pulling off her oxygen and pulling at her telemetry and IV tubing  Consider further discussions regarding palliative care and/or hospice depending upon her clinical course over the next 1-2 days    Pain Plan: # Pain Assessment:   Current Pain Score 2/14/2018   Patient currently in pain? denies   Pain score (0-10) 0    - Mickie is unable to participate in a plan for pain management; however, the plan  was discussed in a collaborative fashion with her caregiver.   - Pharmacologic adjuvants: Acetaminophen    DVT Prophylaxis: Pneumatic Compression Devices  Code Status: DNR/DNI    Disposition: Expected discharge in 2-4 days once fevers and sepsis resolving, tolerating oral intake, oxygenation improving and/or care goals clarified regarding palliative care and hospice.    Kunal Shahid    Interval History   Patient is nonverbal and not able to offer any complaints.  Her caregiver is present today and indicates that she appears better today overall than she had yesterday.  She had fever as high as 103.5 yesterday but has become afebrile.  Heart rate is fluctuated from the normal to mildly tachycardic range and she has been mildly tachypneic.  She continues to be hypoxic requiring oxygen.  Her caregiver reports that her baseline oxygen saturations run 90-93% in room air.  So far, her oxygen requirement has been stable.  Nursing reports that the patient had an obvious choking spell when she tried to swallow a Depakote pill mixed with applesauce.  Her caregiver today reports that the patient normally takes her Depakote after it has been crushed and mixed with applesauce.  Additional history is obtained from the caregiver who also reports that the patient chronically has swallowing problems.  At baseline, she only is given puréed foods and pudding thickened liquids.  Patient had a swallow evaluation at [facility] last fall demonstrating dysphagia with aspiration  at that time.  Her caregiver also reports that the patient does much better with swallowing when she is seated straight upright in her usual chair.  Patient has been voiding spontaneously.  Her caregiver reports that the patient was on hospice care last year until about December when hospice was discontinued because the patient improved.  Palliative care and hospice had been recommended at the time of discharge from her hospitalization last fall at [facility].    -Data reviewed today: I reviewed all new labs and imaging results over the last 24 hours. I personally reviewed no images or EKG's today.    Physical Exam   Temp: 99.7  F (37.6  C) Temp src: Rectal BP: 145/53 Pulse: 88   Resp: 24 SpO2: 95 % O2 Device: Nasal cannula Oxygen Delivery: 4 LPM  Vitals:    02/14/18 2125   Weight: 53.5 kg (117 lb 15.1 oz)     Vital Signs with Ranges  Temp:  [97.8  F (36.6  C)-103.5  F (39.7  C)] 99.7  F (37.6  C)  Pulse:  [] 88  Resp:  [24-40] 24  BP: ()/(51-98) 145/53  SpO2:  [74 %-97 %] 95 %  I/O last 3 completed shifts:  In: 103.33 [I.V.:103.33]  Out: -     Constitutional: Elderly woman, ill-appearing although no overt signs of acute distress  Respiratory: Tachypneic without use of accessory muscles, nonverbal, diminished breath sounds throughout with clear lung fields  Cardiovascular: Tachycardic with regular rhythm, weak but palpable radial pulse, normal capillary refill  Neuro: Appears alert and awake but is nonverbal and does not follow instructions reliably, does follow examiner with her eyes, near constant movements of her mouth with upper extremity tremor    Medications     NaCl 100 mL/hr at 02/14/18 2158       methylPREDNISolone  62.5 mg Intravenous Q12H     ipratropium - albuterol 0.5 mg/2.5 mg/3 mL  3 mL Nebulization Q6H     piperacillin-tazobactam  3.375 g Intravenous Q6H     vancomycin in dextrose  1,000 mg Intravenous Q12H     divalproex sodium delayed-release  250 mg Oral Q8H RAMYA       Data   Data  reviewed today:  I personally reviewed no images or EKG's today.    Recent Labs  Lab 02/15/18  0630 02/14/18  1744   WBC 5.3 5.7   HGB 13.6 13.0   MCV 94 96    206    131*   POTASSIUM 5.1 4.6   CHLORIDE 100 92*   CO2 28 33*   BUN 13 13   CR 0.63 0.55   ANIONGAP 10 6   JOAO 8.4* 8.6   * 67*   ALBUMIN  --  2.8*   PROTTOTAL  --  7.0   BILITOTAL  --  0.2   ALKPHOS  --  74   ALT  --  17   AST  --  16   TROPI  --  <0.015       Recent Results (from the past 24 hour(s))   XR Chest Port 1 View    Narrative    XR CHEST PORT 1 VW 2/14/2018 6:00 PM    HISTORY: Cough.    COMPARISON: November 22, 2013.      Impression    IMPRESSION: There is linear opacification of the left lateral lung  base, possibly focal atelectasis/scarring or infection. The right lung  is clear. No pleural effusions or pneumothorax. Stable heart and  mediastinum.    BINDU BROWN MD     Lactic acid was 1.1 at about 2:40 AM today  Viral respiratory PCR panel is pending

## 2018-02-15 NOTE — PROGRESS NOTES
S-(situation): depakote scheduled    B-(background): aspiration pneumonia. Pt non-verbal. Hx of MR, cerebral palsy and epilepsy    A-(assessment): attempted to have pt dry thickened liquids. Pt choked and coughed after attempt and needed to suctioned. Depakote is long acting and can not be crushed.     R-(recommendations): Note left with charge RN for MD. Awaiting response.   MD aware, no new orders.

## 2018-02-15 NOTE — PROGRESS NOTES
S-(situation): Patient arrives to room 247 via cart from ER    B-(background): pnemonia    A-(assessment): /94, T 99.7axil., HR 's, patient has an infrequent wet sounding cough, lungs are rhonchi with exp wheezing, incontinent of B/B, skin on buttock, ankle and heels are red and blanchable. Extremities are contractured.     R-(recommendations): Orders reviewed with patient and caregiver. Will monitor patient per MD orders.     Inpatient nursing criteria listed below were met:    Health care directives status obtained and documented: Yes  Core Measures assessed (SSI): Yes  SCD's Documented: to be ordered per hospitalist.  Influenza Vaccine assessment done and vaccine ordered if needed: Yes  Skin issues/needs documented:Yes  Isolation needs addressed, if appropriate: Yes  Fall Prevention: Care plan updated, Education given and documented Yes  MRSA swab completed for patient 55 years and older (exclude FARZANEH and TKA): Yes  My Chart patient sign up addressed and documented: Yes  Care Plan initiated: Yes  Education Assessment documented:Yes  Education Documented (Pre-existing chronic infection such as, MRSA/VRE need education on admission): Yes  New medication patient education completed and documented (Possible Side Effects of Common Medications handout): Yes  Home medications if not able to send immediately home with family stored here: NA   Reminder note placed in discharge instructions: NA  Discharge planning review completed (admission navigator) Yes

## 2018-02-15 NOTE — CONSULTS
CARE TRANSITION SOCIAL WORK INITIAL ASSESSMENT:  Reason For Consult: discharge planning   Met with: Caregiver.    DATA  Active Problems:    Infantile cerebral palsy (H)    Epilepsy (H)    Mental retardation    Esophageal reflux    Pneumonia due to infectious organism          Primary Care MD Name: Funmi   Contact information and PCP information verified: Yes      ASSESSMENT  Cognitive Status: awake and alert.             Lives With: facility resident  Living Arrangements: residential facility  Quality Of Family Relationships: supportive, involved  Description of Support System: Supportive, Involved   Who is your support system?: Facility resident(s)/Staff   Support Assessment: Adequate family and caregiver support   Insurance Concerns: No Insurance issues identified        This writer met with caregiver/ anita and introduced self and role. Writer talked on the phone with manager (Carolyn) of [facility] about discharge plan. Manager stated that patient was on hospice from end of July to September and was on O2 during that time. Since being off hospice patient has not had O2. Patient is wheelchair bound. Manager stated that if patient goes home with O2 they would need help setting up the O2. Group home will be taking patient home back to [facility] via van.      PLAN    Go back to group [place]     Discharge Planner   Discharge Plans in progress: yes  Barriers to discharge plan: none   Follow up plan: go to any follow up appointments          Entered by: Latisha Duncan 02/15/2018 11:53 AM          Latisha Guerra   Social Work Student

## 2018-02-15 NOTE — PROVIDER NOTIFICATION
Pt unable to tolerate thickened liquids, started coughing/choking.   MD notified that Depakote was not able to be given.  Okay to hold medication per MD.

## 2018-02-15 NOTE — ED NOTES
Pt only takes po thickened. Resp sound wet. Uncomfortable giving any po at this time. Dr Kothari notified. To give IV Dextrose

## 2018-02-15 NOTE — PLAN OF CARE
Problem: Pneumonia (Adult)  Goal: Signs and Symptoms of Listed Potential Problems Will be Absent, Minimized or Managed (Pneumonia)  Signs and symptoms of listed potential problems will be absent, minimized or managed by discharge/transition of care (reference Pneumonia (Adult) CPG).   Outcome: No Change  Pt continues to have occasional coughing spells.  VSS  Lungs with some coarse sounds.  Oxygen per N/C at 4-5L sats in the upper 80's to mid 90's. Pt is non verbal, incont of urine and does not follow directions.  Continues with IV fluids and IV antibiotics.  Pt is supposes to have a swallow study which will not happen until tomorrow.

## 2018-02-15 NOTE — CONSULTS
Pharmacy Vancomycin Initial Note  Date of Service 2018  Patient's  1940  78 year old, female    Indication: Aspiration Pneumonia    Current estimated CrCl = Estimated Creatinine Clearance: 68.9 mL/min (based on Cr of 0.55).    Creatinine for last 3 days  2018:  5:44 PM Creatinine 0.55 mg/dL    Recent Vancomycin Level(s) for last 3 days  No results found for requested labs within last 72 hours.      Vancomycin IV Administrations (past 72 hours)                   vancomycin (VANCOCIN) 1000 mg in dextrose 5% 200 mL PREMIX (mg) 1,000 mg New Bag 18                Nephrotoxins and other renal medications (Future)    Start     Dose/Rate Route Frequency Ordered Stop    18  piperacillin-tazobactam (ZOSYN) infusion 3.375 g     Comments:  DO NOT USE THIS FIELD FOR ADMIN INSTRUCTIONS; INFORMATION DOES NOT SHOW ON MAR. USE THE FIELD ABOVE MARKED ADMIN INSTRUCTIONS    3.375 g  100 mL/hr over 30 Minutes Intravenous EVERY 6 HOURS 18 2100      18  vancomycin (VANCOCIN) 1000 mg in dextrose 5% 200 mL PREMIX      1,000 mg  200 mL/hr over 1 Hours Intravenous EVERY 24 HOURS 18 194            Contrast Orders - past 72 hours     None                Plan:  1.  Start vancomycin  1000 mg IV q24h.   2.  Goal Trough Level: 15-20 mg/L   3.  Pharmacy will check trough levels as appropriate in 1-3 Days.    4. Serum creatinine levels will be ordered daily for the first week of therapy and at least twice weekly for subsequent weeks.    5. Villanueva method utilized to dose vancomycin therapy: Method 2    Kaushal Salas

## 2018-02-16 ENCOUNTER — APPOINTMENT (OUTPATIENT)
Dept: SPEECH THERAPY | Facility: CLINIC | Age: 78
DRG: 871 | End: 2018-02-16
Attending: PEDIATRICS
Payer: COMMERCIAL

## 2018-02-16 LAB
BACTERIA SPEC CULT: NORMAL
PROCALCITONIN SERPL-MCNC: 0.39 NG/ML
SPECIMEN SOURCE: NORMAL

## 2018-02-16 PROCEDURE — 36415 COLL VENOUS BLD VENIPUNCTURE: CPT | Performed by: PEDIATRICS

## 2018-02-16 PROCEDURE — 25000128 H RX IP 250 OP 636: Performed by: FAMILY MEDICINE

## 2018-02-16 PROCEDURE — 25000125 ZZHC RX 250: Performed by: FAMILY MEDICINE

## 2018-02-16 PROCEDURE — 25000132 ZZH RX MED GY IP 250 OP 250 PS 637: Performed by: PEDIATRICS

## 2018-02-16 PROCEDURE — 92610 EVALUATE SWALLOWING FUNCTION: CPT | Mod: GN | Performed by: SPEECH-LANGUAGE PATHOLOGIST

## 2018-02-16 PROCEDURE — 25000132 ZZH RX MED GY IP 250 OP 250 PS 637: Performed by: FAMILY MEDICINE

## 2018-02-16 PROCEDURE — 99232 SBSQ HOSP IP/OBS MODERATE 35: CPT | Performed by: FAMILY MEDICINE

## 2018-02-16 PROCEDURE — 40000225 ZZH STATISTIC SLP WARD VISIT: Performed by: SPEECH-LANGUAGE PATHOLOGIST

## 2018-02-16 PROCEDURE — 84145 PROCALCITONIN (PCT): CPT | Performed by: PEDIATRICS

## 2018-02-16 PROCEDURE — 12000000 ZZH R&B MED SURG/OB

## 2018-02-16 PROCEDURE — 94640 AIRWAY INHALATION TREATMENT: CPT

## 2018-02-16 RX ADMIN — SODIUM CHLORIDE: 9 INJECTION, SOLUTION INTRAVENOUS at 21:15

## 2018-02-16 RX ADMIN — VALPROIC ACID 250 MG: 250 SOLUTION ORAL at 21:04

## 2018-02-16 RX ADMIN — TAZOBACTAM SODIUM AND PIPERACILLIN SODIUM 3.38 G: 375; 3 INJECTION, SOLUTION INTRAVENOUS at 22:20

## 2018-02-16 RX ADMIN — ALBUTEROL SULFATE 2.5 MG: 2.5 SOLUTION RESPIRATORY (INHALATION) at 01:08

## 2018-02-16 RX ADMIN — IPRATROPIUM BROMIDE AND ALBUTEROL SULFATE 3 ML: .5; 3 SOLUTION RESPIRATORY (INHALATION) at 03:46

## 2018-02-16 RX ADMIN — ACETAMINOPHEN 650 MG: 650 SUPPOSITORY RECTAL at 21:24

## 2018-02-16 RX ADMIN — METHYLPREDNISOLONE SODIUM SUCCINATE 62.5 MG: 125 INJECTION, POWDER, FOR SOLUTION INTRAMUSCULAR; INTRAVENOUS at 07:46

## 2018-02-16 RX ADMIN — TAZOBACTAM SODIUM AND PIPERACILLIN SODIUM 3.38 G: 375; 3 INJECTION, SOLUTION INTRAVENOUS at 03:46

## 2018-02-16 RX ADMIN — IPRATROPIUM BROMIDE AND ALBUTEROL SULFATE 3 ML: .5; 3 SOLUTION RESPIRATORY (INHALATION) at 20:41

## 2018-02-16 RX ADMIN — VALPROIC ACID 250 MG: 250 SOLUTION ORAL at 05:17

## 2018-02-16 RX ADMIN — ALBUTEROL SULFATE 2.5 MG: 2.5 SOLUTION RESPIRATORY (INHALATION) at 05:17

## 2018-02-16 RX ADMIN — VALPROIC ACID 250 MG: 250 SOLUTION ORAL at 14:03

## 2018-02-16 RX ADMIN — TAZOBACTAM SODIUM AND PIPERACILLIN SODIUM 3.38 G: 375; 3 INJECTION, SOLUTION INTRAVENOUS at 17:48

## 2018-02-16 RX ADMIN — TAZOBACTAM SODIUM AND PIPERACILLIN SODIUM 3.38 G: 375; 3 INJECTION, SOLUTION INTRAVENOUS at 09:45

## 2018-02-16 RX ADMIN — IPRATROPIUM BROMIDE AND ALBUTEROL SULFATE 3 ML: .5; 3 SOLUTION RESPIRATORY (INHALATION) at 13:49

## 2018-02-16 RX ADMIN — VANCOMYCIN HYDROCHLORIDE 1000 MG: 1 INJECTION, SOLUTION INTRAVENOUS at 07:52

## 2018-02-16 RX ADMIN — METHYLPREDNISOLONE SODIUM SUCCINATE 62.5 MG: 125 INJECTION, POWDER, FOR SOLUTION INTRAMUSCULAR; INTRAVENOUS at 19:57

## 2018-02-16 RX ADMIN — SODIUM CHLORIDE: 9 INJECTION, SOLUTION INTRAVENOUS at 06:21

## 2018-02-16 NOTE — PLAN OF CARE
Problem: Patient Care Overview  Goal: Plan of Care/Patient Progress Review  Outcome: Improving  Patient removed nasal cannula multiple times through the night. Patient desaturated into the mid to high 80%s several times. One instance of desaturation required 15LPM O2 via Oxymask to increase her into the 90%s, but once she recovered returned to 5LPM O2 via NC. Patient also required oral suctioning several times to clear secretions. Patient received 2 PRN albuterol nebs. Lung sounds are coarse and diminished. Patient is nonverbal and showed no signs of nonverbal pain. Vital signs stable, will continue to monitor.

## 2018-02-16 NOTE — PROGRESS NOTES
[facility] Progress Note          Assessment and Plan:   Assessment:   Principal Problem:    Pneumonia due to infectious organism    Assessment: Most likely aspiration pneumonia, although the location is not classic in nature. patient has been recovering well on vancomycin and Zosyn and MRSA swab is negative.   patient is starting to wean down on O2 supplementation, has been afebrile for over 24 hours, and is clinically getting closer to her previous baseline     Plan: We will discontinue IV vancomycin and continue treatment with IV Levaquin alone.  Will continue with Solu-Medrol and scheduled duo nebs to support respiratory function.  If patient continues to improve, would consider transition to oral antibiotics in the next 1-2 days.  Continue to wean O2 supplementation as tolerated.  Anticipate patient will discharge back to her group home when medically safe.    Active Problems:    Sepsis (H) - fever, tachypnea, tachycardia    Assessment:  Likely secondary to aspiration pneumonia, starting to improve with patient now afebrile for over 24 hours    Plan: Continue with treatment as above      Acute respiratory failure with hypoxia (H)    Assessment: Likely secondary to aspiration pneumonia as above, with O2 saturations starting to improve and weaning down on O2 supplementation    Plan:  Continue treatment plan and wean O2 as tolerated      Dysphagia    Assessment:   Chronic and ongoing, with patient having known aspiration during video swallow study performed last year.  Speech therapy evaluated today and it is felt patient is likely at her baseline     Plan: Continue with current dysphagia diet, monitor closely for any aspiration increased risk      Infantile cerebral palsy (H)    Assessment: Chronic and stable    Plan: Continue supportive cares       Epilepsy (H)    Assessment:   Previous history     Plan: continue current Depakote medications      Mental retardation    Assessment:  chronic and stable     Plan:   continue supportive cares      Esophageal reflux    Assessment:  previous history but patient has not been taking anything daily for this prior to admission    Plan: continue to monitor       # Pain Assessment:   Current Pain Score 2/16/2018 2/16/2018 2/16/2018   Patient currently in pain? no no no   Pain score (0-10) - - -    - Mickie is unable to participate in a plan for pain management; however, the plan  was discussed in a collaborative fashion with one of her group home cares that was present.   - Patient does not appear in pain - no medications are needed      VTE:  SCDs  Code Status:  DNR/DNI        Interval History:   Continues to improve - has been weaned from 5L to 3L NC oxygen throughout the day and tolerating it well.  Has now been afebrile for over 24 hours.  Vital signs generally better.  Appetite is much improved and voiding well.  Tolerating medications without significant side effects.  No new concerns today.           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 Discharged 01/29/06     Osteoarthrosis, unspecified whether generalized or localized, unspecified site     Osteoarthritis     Pica      Pneumonia, organism unspecified(486) 01/31/09     Rectal bleeding 6/1/2012     Stricture and stenosis of esophagus      Ulcer of ankle (H) 4/28/2011     Unspecified disease of respiratory system     Pulmonary fibrosis     Unspecified intellectual disabilities     Mental retardation     Urinary tract infection, site not specified     Recurrent            Physical Exam:   Blood pressure 124/46, pulse 74, temperature 98.8  F (37.1  C), temperature source Axillary, resp. rate  "18, height 1.626 m (5' 4\"), weight 53.5 kg (117 lb 15.1 oz), SpO2 94 %.  Constitutional:   awake, alert no apparent distress, and appears stated age, not able to follow directions because of baseline mental status     Lungs:   No increased work of breathing, good air exchange, mild crackles present in the left more than right lung base, no wheezing auscultated      Cardiovascular:   Normal apical impulse, regular rate and rhythm, normal S1 and S2, no S3 or S4, and no murmur noted     Abdomen:   Bowel sounds present, abdomen soft and no apparent tenderness     Musculoskeletal:   no lower extremity pitting edema present     Neurologic:   Awake, alert, non-verbal, does make some eye contact - per care giver present this is patient's baseline     Skin:   normal skin color, texture, turgor             Data:   All laboratory data reviewed    Attestation:  I have reviewed today's vital signs, notes, medications, labs and imaging.     Electronically Signed:  Adelina Miller MD    Note: Chart documentation done in part with Dragon Voice Recognition software. Although reviewed after completion, some word and grammatical errors may remain.       "

## 2018-02-16 NOTE — PROGRESS NOTES
02/16/18 1100   General Information   Onset Date 02/14/18   Referring Physician Dr. Shahid   Swallowing Evaluation Bedside swallow evaluation   Behaviorial Observations Other (Comment)  (Patient has the medical diagnoses of CP and MR, she is nonverbal   Mode of current nutrition Oral diet   Type of oral diet Pudding - thick liquid;Dysphagia diet level 1   Respiratory Status O2 Supply   Type of O2 supply Nasal cannula   Comments Mickie is an 78 year old female admitted to the hospital 2/14/18 due to pneumonia. Mickie is nonverbal; she is diagnosed with CP and MR resulting in significant cognitive deficits. Mickie currently resides in a group home and her baseline diet is puree solids and pudding thick liquids. Per review of medical chart, Mickie had a video swallow study in the fall which revealed she is at high risk for aspiration. Mickie was placed on hospice care last year; however that was discontinued due to patient improvement.  Caregivers were not present at the time of today's evaluation to provide further information.   Clinical Swallow Evaluation   Oral Musculature unable to assess due to poor participation/comprehension   Dentition edentulous, does not have dentures   Clinical Swallow Eval: Pudding Thick Liquid Texture Trial   Mode of Presentation, Pudding spoon;fed by clinician   Volume of Pudding Presented 3 oz   Oral Phase, Pudding Poor AP movement;Residue in oral cavity;other (see comments)  (tongue thrust pattern, pocketing)   Oral Residue, Pudding mid posterior tongue;left anterior lateral sulci   Pharyngeal Phase, Pudding other (see comments)  (delayed swallow)   Successful Strategies Trialed During Procedure, Pudding other (see comments)  (small bites, slow presentation, upright, tactile cues to buccal cavity when pocketing)   Diagnostic Statement Patient tolerated 3 oz pudding thick liquid fed by the clinician. Mickie demonstrated severe oral deficits. She required 4-8 tongue thrust cycles in order to  transition the bolus to the posterior oral cavity to initiate the swallow. Mickie demonstrated an audible swallow which was used as a cue to present the next bolus.    Clinical Swallow Eval: Puree Solid Texture Trial   Mode of Presentation, Puree spoon;fed by clinician   Volume of Puree Presented 1 oz   Oral Phase, Puree Poor AP movement;Residue in oral cavity;other (see comments)  (tongue thrust, oral pocketing)   Oral Residue, Puree mid posterior tongue;left anterior lateral sulci   Pharyngeal Phase, Puree other (see comments)  (delayed swallow)   Swallow Compensations   Swallow Compensations Pacing;Reduce amounts;Multiple swallow   Swallow Eval: Clinical Impressions   Skilled Criteria for Therapy Intervention No significant expected  improvement in functional status   Functional Assessment Scale (FAS) 1   Dysphagia Outcome Severity Scale (STONEY) Level 1 - STONEY   Treatment Diagnosis Severe Oropharyngeal Dysphagia   Diet texture recommendations Dysphagia diet level 1;Pudding thick liquids   Recommended Feeding/Eating Techniques check mouth frequently for oral residue/pocketing;maintain upright posture during/after eating for 30 mins;small sips/bites;other (see comments)  (pacing)   Risks and Benefits of Treatment have been explained. Yes   Patient, family and/or staff in agreement with Plan of Care Yes   Clinical Impression Comments Mickie presents with severe oropharyngeal dysphagia characterized by poor oral transit, tongue thrust pattern and delayed swallow trigger resulting in oral residue and buccal pocketing. Mickie required 4-8 tongue thrust cycles in order to initiate her swallow. Due to severe oropharyngeal deficits, Mickie required 12 minutes to consume 4 oz of puree/pudding thick textures. Given Mickie's baseline medical diagnoses, severe dysphagia and clinical status she is at high risk for aspiration. Recommend: 1.) Puree solids 2.) Pudding thick liquids 3.) Upright for PO intake 4.) Remain upright for 30 minutes  following meals to allow residue in pharynx to clear 5.) Provide small bites 6.) Allow Mickie to swallow/clear oral cavity before presenting next bite 6.) Monitor oral residue in buccal cavity following meal 7.) Discontinue oral intake with coughing   Therapy Certification   Medical Diagnosis Pneumonia, Cerebral Palsy   Total Evaluation Time   Total Evaluation Time (Minutes) 20     Marizol Brenner MA, CCC-SLP

## 2018-02-16 NOTE — PLAN OF CARE
Problem: Patient Care Overview  Goal: Plan of Care/Patient Progress Review  Outcome: Improving  Patient removed nasal cannula several times throughout the night. Patient desaturated to 87% and oxymask was applied. Oxymask was titrated up to 15 LPM before saturations went above 90%. Patient received a PRN albuterol neb at this time and was suctioned s

## 2018-02-16 NOTE — PROGRESS NOTES
SPIRITUAL HEALTH SERVICES  SPIRITUAL ASSESSMENT Progress Note  [facility]      During Rounding,  introduced himself to Mickie Landin and informed her of his availability.    Jimbo Mills M.Div., [facility]  Staff   Office tel: [phone]

## 2018-02-17 LAB
ANION GAP SERPL CALCULATED.3IONS-SCNC: 7 MMOL/L (ref 3–14)
BUN SERPL-MCNC: 21 MG/DL (ref 7–30)
CALCIUM SERPL-MCNC: 8.5 MG/DL (ref 8.5–10.1)
CHLORIDE SERPL-SCNC: 110 MMOL/L (ref 94–109)
CO2 SERPL-SCNC: 31 MMOL/L (ref 20–32)
CREAT SERPL-MCNC: 0.58 MG/DL (ref 0.52–1.04)
GFR SERPL CREATININE-BSD FRML MDRD: >90 ML/MIN/1.7M2
GLUCOSE SERPL-MCNC: 137 MG/DL (ref 70–99)
POTASSIUM SERPL-SCNC: 3.6 MMOL/L (ref 3.4–5.3)
PROCALCITONIN SERPL-MCNC: 0.15 NG/ML
SODIUM SERPL-SCNC: 148 MMOL/L (ref 133–144)

## 2018-02-17 PROCEDURE — 12000000 ZZH R&B MED SURG/OB

## 2018-02-17 PROCEDURE — 25000128 H RX IP 250 OP 636: Performed by: FAMILY MEDICINE

## 2018-02-17 PROCEDURE — 25000132 ZZH RX MED GY IP 250 OP 250 PS 637: Performed by: PEDIATRICS

## 2018-02-17 PROCEDURE — 84145 PROCALCITONIN (PCT): CPT | Performed by: FAMILY MEDICINE

## 2018-02-17 PROCEDURE — 36415 COLL VENOUS BLD VENIPUNCTURE: CPT | Performed by: FAMILY MEDICINE

## 2018-02-17 PROCEDURE — 99232 SBSQ HOSP IP/OBS MODERATE 35: CPT | Performed by: FAMILY MEDICINE

## 2018-02-17 PROCEDURE — 94640 AIRWAY INHALATION TREATMENT: CPT

## 2018-02-17 PROCEDURE — 25000125 ZZHC RX 250: Performed by: FAMILY MEDICINE

## 2018-02-17 PROCEDURE — 94640 AIRWAY INHALATION TREATMENT: CPT | Mod: 76

## 2018-02-17 PROCEDURE — 80048 BASIC METABOLIC PNL TOTAL CA: CPT | Performed by: FAMILY MEDICINE

## 2018-02-17 RX ADMIN — TAZOBACTAM SODIUM AND PIPERACILLIN SODIUM 3.38 G: 375; 3 INJECTION, SOLUTION INTRAVENOUS at 09:44

## 2018-02-17 RX ADMIN — VALPROIC ACID 250 MG: 250 SOLUTION ORAL at 21:17

## 2018-02-17 RX ADMIN — TAZOBACTAM SODIUM AND PIPERACILLIN SODIUM 3.38 G: 375; 3 INJECTION, SOLUTION INTRAVENOUS at 21:24

## 2018-02-17 RX ADMIN — IPRATROPIUM BROMIDE AND ALBUTEROL SULFATE 3 ML: .5; 3 SOLUTION RESPIRATORY (INHALATION) at 03:07

## 2018-02-17 RX ADMIN — IPRATROPIUM BROMIDE AND ALBUTEROL SULFATE 3 ML: .5; 3 SOLUTION RESPIRATORY (INHALATION) at 09:25

## 2018-02-17 RX ADMIN — METHYLPREDNISOLONE SODIUM SUCCINATE 62.5 MG: 125 INJECTION, POWDER, FOR SOLUTION INTRAMUSCULAR; INTRAVENOUS at 08:45

## 2018-02-17 RX ADMIN — TAZOBACTAM SODIUM AND PIPERACILLIN SODIUM 3.38 G: 375; 3 INJECTION, SOLUTION INTRAVENOUS at 16:26

## 2018-02-17 RX ADMIN — VALPROIC ACID 250 MG: 250 SOLUTION ORAL at 14:00

## 2018-02-17 RX ADMIN — IPRATROPIUM BROMIDE AND ALBUTEROL SULFATE 3 ML: .5; 3 SOLUTION RESPIRATORY (INHALATION) at 21:22

## 2018-02-17 RX ADMIN — METHYLPREDNISOLONE SODIUM SUCCINATE 62.5 MG: 125 INJECTION, POWDER, FOR SOLUTION INTRAMUSCULAR; INTRAVENOUS at 20:04

## 2018-02-17 RX ADMIN — TAZOBACTAM SODIUM AND PIPERACILLIN SODIUM 3.38 G: 375; 3 INJECTION, SOLUTION INTRAVENOUS at 03:52

## 2018-02-17 RX ADMIN — VALPROIC ACID 250 MG: 250 SOLUTION ORAL at 06:04

## 2018-02-17 RX ADMIN — IPRATROPIUM BROMIDE AND ALBUTEROL SULFATE 3 ML: .5; 3 SOLUTION RESPIRATORY (INHALATION) at 15:39

## 2018-02-17 NOTE — PROGRESS NOTES
[facility] Progress Note          Assessment and Plan:   Assessment:   Principal Problem:    Pneumonia due to infectious organism    Assessment: Patient continues to slowly improve - oxygen continues to be weaned as tolerated and is on 2-3 L throughout the day today, decreased from the 3-5 L needed yesterday, having less cough and better appetite, no new symptoms.    Plan: Continue with IV Zosyn alone for antibiotic coverage given high concern for aspiration etiology and negative nasal MRSA swab.  Will recheck procalcitonin tomorrow to see if patient will need ongoing antibiotics, as would consider transitioning to oral antibiotic at that time if patient continues to improve    Active Problems:    Sepsis (H) - fever, tachypnea, tachycardia    Assessment:  secondary to pneumonia, with symptoms continued to improve.  Patient has been afebrile for over 24 hours    Plan: Continue with IV Zosyn and treatment plan as above      Acute respiratory failure with hypoxia (H)    Assessment: Secondary to pneumonia, continuing to improve with O2 supplementation needs decreasing    Plan: Continue plan as above chronic and ongoing      Dysphagia    Assessment: With a video swallow study last year performing aspiration and patient at high risk for aspiration going forward secondary to fatigue but doing very well with eating throughout the day as she clinically improves    Plan: continue current dysphagia diet and monitor closely      Infantile cerebral palsy (H)    Assessment: Chronic and stable    Plan: Continue supportive cares      Epilepsy (H)    Assessment: Previous history    Plan: Continue Depakote      Mental retardation    Assessment: Chronic and stable, patient nonverbal    Plan: Continue supportive care      Esophageal reflux    Assessment: Previous history the patient is not taking anything for this chronically     Plan: Continue to monitor but no medication needed at this time       # Pain Assessment:   Current  "Pain Score 2/16/2018 2/16/2018 2/16/2018   Patient currently in pain? no no no   Pain score (0-10) - - -   - Mickie is unable to participate in a collaborative plan for pain management due to cognitive impairment at baseline and non-verbal status but patient does not have non-verbal signs of pain per nursing staff.   - Patient does not appear to be in pain - no medications are needed    VTE:  SCDs  Code Status:  DNR/DNI        Interval History:   Continues to improve.  Vital signs generally better with patient afebrile today.  Eating better and with great enthusiasm and voiding well.  Tolerating medications without significant side effects.  No new concerns today.           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 Discharged 01/29/06     Osteoarthrosis, unspecified whether generalized or localized, unspecified site     Osteoarthritis     Pica      Pneumonia, organism unspecified(486) 01/31/09     Rectal bleeding 6/1/2012     Stricture and stenosis of esophagus      Ulcer of ankle (H) 4/28/2011     Unspecified disease of respiratory system     Pulmonary fibrosis     Unspecified intellectual disabilities     Mental retardation     Urinary tract infection, site not specified     Recurrent            Physical Exam:   Blood pressure 132/68, pulse 88, temperature 99  F (37.2  C), temperature source Rectal, resp. rate 20, height 1.626 m (5' 4\"), weight 53.5 kg (117 lb 15.1 oz), SpO2 93 %.  Constitutional:   awake, alert, cooperative, no apparent distress, and appears stated age     Lungs:   No increased work of breathing, good air exchange, mild crackles heart in left more than right lung but " both are improved compared to yesterday     Cardiovascular:   Normal apical impulse, regular rate and rhythm, normal S1 and S2, no S3 or S4, and no murmur noted     Abdomen:   Bowel sounds present, abdomen soft and non-tender     Musculoskeletal:   no lower extremity pitting edema present  Baseline rigidity and tremor present     Neurologic:   Awake, alert, non-verbal but does make eye contact and smiles at one point when food is mentioned              Data:   All laboratory data reviewed    Attestation:  I have reviewed today's vital signs, notes, medications, labs and imaging.     Electronically Signed:  Adelina Miller MD    Note: Chart documentation done in part with Dragon Voice Recognition software. Although reviewed after completion, some word and grammatical errors may remain.

## 2018-02-17 NOTE — PLAN OF CARE
Problem: Patient Care Overview  Goal: Plan of Care/Patient Progress Review  Outcome: Improving  Pt is using 2L per nc O2 with O2 sats 92-93%. Has occasional harsh, tight cough, nonproductive. Lungs are diminished with scattered fine crackles and occasional rhonchi. Temp max 100.2 (R) and was given tylenol, 99.0 (R) thereafter. Other vss. No nonverbal indicators of pain noted. Has been incontinent of urine. Will continue to monitor respiratory status, IV fluids, abx.

## 2018-02-17 NOTE — PLAN OF CARE
Problem: Pneumonia (Adult)  Goal: Signs and Symptoms of Listed Potential Problems Will be Absent, Minimized or Managed (Pneumonia)  Signs and symptoms of listed potential problems will be absent, minimized or managed by discharge/transition of care (reference Pneumonia (Adult) CPG).   Outcome: Improving  Lungs with some rhonchi. Less congested and rare coughing. Temps were 98.6(ax),98.8(ax), 98.3(o), 98.2(o). Sats in mid 90's on 3 liters oxygen per NC. Ate well when she was fed per caregiver and aide. Repositioned every two hours. Soles of feet less red. Heel protectors on and bed alarm used. Incontinent of bowel and bladder. IV fluids and ABX continue. Will continue to monitor temps, lung sounds, sats, skin. Will continue to wean oxygen.

## 2018-02-18 PROCEDURE — 25000128 H RX IP 250 OP 636: Performed by: FAMILY MEDICINE

## 2018-02-18 PROCEDURE — 99232 SBSQ HOSP IP/OBS MODERATE 35: CPT | Performed by: FAMILY MEDICINE

## 2018-02-18 PROCEDURE — 94640 AIRWAY INHALATION TREATMENT: CPT | Mod: 76

## 2018-02-18 PROCEDURE — 94640 AIRWAY INHALATION TREATMENT: CPT

## 2018-02-18 PROCEDURE — 25000125 ZZHC RX 250: Performed by: FAMILY MEDICINE

## 2018-02-18 PROCEDURE — 25000132 ZZH RX MED GY IP 250 OP 250 PS 637: Performed by: PEDIATRICS

## 2018-02-18 PROCEDURE — 12000000 ZZH R&B MED SURG/OB

## 2018-02-18 PROCEDURE — 25000132 ZZH RX MED GY IP 250 OP 250 PS 637: Performed by: FAMILY MEDICINE

## 2018-02-18 RX ADMIN — IPRATROPIUM BROMIDE AND ALBUTEROL SULFATE 3 ML: .5; 3 SOLUTION RESPIRATORY (INHALATION) at 15:48

## 2018-02-18 RX ADMIN — IPRATROPIUM BROMIDE AND ALBUTEROL SULFATE 3 ML: .5; 3 SOLUTION RESPIRATORY (INHALATION) at 21:27

## 2018-02-18 RX ADMIN — VALPROIC ACID 250 MG: 250 SOLUTION ORAL at 07:31

## 2018-02-18 RX ADMIN — TAZOBACTAM SODIUM AND PIPERACILLIN SODIUM 3.38 G: 375; 3 INJECTION, SOLUTION INTRAVENOUS at 10:53

## 2018-02-18 RX ADMIN — METHYLPREDNISOLONE SODIUM SUCCINATE 62.5 MG: 125 INJECTION, POWDER, FOR SOLUTION INTRAMUSCULAR; INTRAVENOUS at 19:50

## 2018-02-18 RX ADMIN — IPRATROPIUM BROMIDE AND ALBUTEROL SULFATE 3 ML: .5; 3 SOLUTION RESPIRATORY (INHALATION) at 08:00

## 2018-02-18 RX ADMIN — VALPROIC ACID 250 MG: 250 SOLUTION ORAL at 21:21

## 2018-02-18 RX ADMIN — TAZOBACTAM SODIUM AND PIPERACILLIN SODIUM 3.38 G: 375; 3 INJECTION, SOLUTION INTRAVENOUS at 04:02

## 2018-02-18 RX ADMIN — TAZOBACTAM SODIUM AND PIPERACILLIN SODIUM 3.38 G: 375; 3 INJECTION, SOLUTION INTRAVENOUS at 21:30

## 2018-02-18 RX ADMIN — TAZOBACTAM SODIUM AND PIPERACILLIN SODIUM 3.38 G: 375; 3 INJECTION, SOLUTION INTRAVENOUS at 16:47

## 2018-02-18 RX ADMIN — IPRATROPIUM BROMIDE AND ALBUTEROL SULFATE 3 ML: .5; 3 SOLUTION RESPIRATORY (INHALATION) at 04:00

## 2018-02-18 RX ADMIN — ACETAMINOPHEN 650 MG: 650 SUPPOSITORY RECTAL at 11:37

## 2018-02-18 RX ADMIN — VALPROIC ACID 250 MG: 250 SOLUTION ORAL at 14:13

## 2018-02-18 RX ADMIN — METHYLPREDNISOLONE SODIUM SUCCINATE 62.5 MG: 125 INJECTION, POWDER, FOR SOLUTION INTRAMUSCULAR; INTRAVENOUS at 07:40

## 2018-02-18 NOTE — PLAN OF CARE
Problem: Patient Care Overview  Goal: Plan of Care/Patient Progress Review  Outcome: Improving  Pt using 3L nc with O2 sats mid-high 90s while asleep, 90-92% while awake. Has occasional tight nonproductive cough. Lungs are coarse t/o. Has been incontinent of urine. Temp was 100.3 (axillary) then checked rectal temp and was 100.0 (R). Later was 99.7 (Ax) and then 99.6 (R) and pt was diaphoretic. No s/s pain noted. Will continue to monitor respiratory status, vs.

## 2018-02-18 NOTE — PROGRESS NOTES
[facility] Progress Note          Assessment and Plan:   Assessment:   Principal Problem:    Pneumonia due to infectious organism    Assessment: Patient continuing to improve, wean to room air to 1 L throughout the day today with less cough, better appetite, and no new symptoms or concerns    Plan:  Continue with IV Zosyn alone for antibiotic coverage given high concern for aspiration etiology and negative nasal MRSA swab.  Pro-calcitonin continues to decrease, which is consistent with patient's clinical improvement picture.    Active Problems:    Sepsis (H) - fever, tachypnea, tachycardia    Assessment:  Secondary to pneumonia, with sepsis symptoms resolving    Plan: Continue with antibiotics as above      Acute respiratory failure with hypoxia (H)    Assessment: Secondary to pneumonia, continuing to improve with O2 needs decreasing    Plan: Continue to wean oxygen as able.      Dysphagia    Assessment: With  video swallow study last year performed showing ongoing aspiration with patient at high risk for aspiration pneumonia going forward secondary to muscle fatigue, however patient has done very well with eating as she clinically improves    Plan: Continue current dysphagia diet and monitor      Infantile cerebral palsy (H)    Assessment: Chronic and stable    Plan: Continue supportive cares      Epilepsy (H)    Assessment: Previous stroke    Plan: Continue Depakote       Mental retardation    Assessment: Chronic and stable, patient nonverbal    Plan: Continue supportive care      Esophageal reflux    Assessment: Previous history with the patient not taking anything chronically    Plan: Continue to monitor but no medication needed at this time       # Pain Assessment:   Current Pain Score 2/17/2018 2/17/2018 2/16/2018   Patient currently in pain? no no no   Pain score (0-10) - - -   - Mickie is unable to participate in a collaborative plan for pain management due to baseline cognitive status but per nursing  "staff does not show any non-verbal cues of pain.   - patient does not appear to be in pain - no medications needed      VTE:  SCDs  Code Status:  DNR/DNI        Interval History:   Continues to improve.  Vital signs generally better.  Eating and voiding well.  Tolerating medications without significant side effects.  No new concerns today.           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 Discharged 01/29/06     Osteoarthrosis, unspecified whether generalized or localized, unspecified site     Osteoarthritis     Pica      Pneumonia, organism unspecified(486) 01/31/09     Rectal bleeding 6/1/2012     Stricture and stenosis of esophagus      Ulcer of ankle (H) 4/28/2011     Unspecified disease of respiratory system     Pulmonary fibrosis     Unspecified intellectual disabilities     Mental retardation     Urinary tract infection, site not specified     Recurrent            Physical Exam:   Blood pressure 122/55, pulse 75, temperature 100.9  F (38.3  C), temperature source Rectal, resp. rate 22, height 1.626 m (5' 4\"), weight 53.5 kg (117 lb 15.1 oz), SpO2 94 %.  Constitutional:   awake, alert, cooperative, no apparent distress, and appears stated age     Lungs:   No increased work of breathing, improving air exchange, mild crackles heard bilaterally continue to lessen     Cardiovascular:   Normal apical impulse, regular rate and rhythm, normal S1 and S2, no S3 or S4, and no murmur noted     Abdomen:   Bowel sounds present, abdomen soft     Musculoskeletal:   Baseline rigidity and tremor noted   no lower extremity pitting edema present     Neurologic:   Awake, alert, non-verbal but " makes eye contact and several times holds out her hand to be held during exam and brings the combined hands to her heart and smiles.  Appears to be at baseline mentation           Data:   All laboratory data reviewed    Attestation:  I have reviewed today's vital signs, notes, medications, labs and imaging.     Electronically Signed:  Adelina Miller MD    Note: Chart documentation done in part with Dragon Voice Recognition software. Although reviewed after completion, some word and grammatical errors may remain.

## 2018-02-18 NOTE — PLAN OF CARE
Problem: Pneumonia (Adult)  Goal: Signs and Symptoms of Listed Potential Problems Will be Absent, Minimized or Managed (Pneumonia)  Signs and symptoms of listed potential problems will be absent, minimized or managed by discharge/transition of care (reference Pneumonia (Adult) CPG).   Outcome: Improving  Less coughing today. Temps of 98.9 ax, 99.7rectal, 97.7ax, and 98.9ax today. Lungs with pleural rub and diminished. Incontinent of urine in moderate amount. Ate well. Sodium was up to 148 today. Saline locked IV. Repositioned pt every two hours and kept her head of bed up to 90 degrees for 30 minutes after eating and taking oral meds. Attempted to wean off of oxygen; was able to decrease to 2 liters when sleeping, but needs 3 liters to maintain sats when awake. Will continue to monitor lung sounds, sats, temps,labs, safety.

## 2018-02-19 LAB
ANION GAP SERPL CALCULATED.3IONS-SCNC: 7 MMOL/L (ref 3–14)
BUN SERPL-MCNC: 23 MG/DL (ref 7–30)
CALCIUM SERPL-MCNC: 9.2 MG/DL (ref 8.5–10.1)
CHLORIDE SERPL-SCNC: 102 MMOL/L (ref 94–109)
CO2 SERPL-SCNC: 34 MMOL/L (ref 20–32)
CREAT SERPL-MCNC: 0.62 MG/DL (ref 0.52–1.04)
ERYTHROCYTE [DISTWIDTH] IN BLOOD BY AUTOMATED COUNT: 15.3 % (ref 10–15)
GFR SERPL CREATININE-BSD FRML MDRD: >90 ML/MIN/1.7M2
GLUCOSE SERPL-MCNC: 130 MG/DL (ref 70–99)
HCT VFR BLD AUTO: 42.8 % (ref 35–47)
HGB BLD-MCNC: 13.2 G/DL (ref 11.7–15.7)
LACTATE BLD-SCNC: 2 MMOL/L (ref 0.7–2)
LACTATE BLD-SCNC: 3 MMOL/L (ref 0.7–2)
MCH RBC QN AUTO: 30.2 PG (ref 26.5–33)
MCHC RBC AUTO-ENTMCNC: 30.8 G/DL (ref 31.5–36.5)
MCV RBC AUTO: 98 FL (ref 78–100)
PLATELET # BLD AUTO: 286 10E9/L (ref 150–450)
POTASSIUM SERPL-SCNC: 3.9 MMOL/L (ref 3.4–5.3)
PROCALCITONIN SERPL-MCNC: <0.05 NG/ML
RBC # BLD AUTO: 4.37 10E12/L (ref 3.8–5.2)
SODIUM SERPL-SCNC: 143 MMOL/L (ref 133–144)
WBC # BLD AUTO: 6.4 10E9/L (ref 4–11)

## 2018-02-19 PROCEDURE — 85027 COMPLETE CBC AUTOMATED: CPT | Performed by: FAMILY MEDICINE

## 2018-02-19 PROCEDURE — 25000125 ZZHC RX 250: Performed by: FAMILY MEDICINE

## 2018-02-19 PROCEDURE — 87040 BLOOD CULTURE FOR BACTERIA: CPT | Performed by: FAMILY MEDICINE

## 2018-02-19 PROCEDURE — 84145 PROCALCITONIN (PCT): CPT | Performed by: FAMILY MEDICINE

## 2018-02-19 PROCEDURE — 25000132 ZZH RX MED GY IP 250 OP 250 PS 637: Performed by: FAMILY MEDICINE

## 2018-02-19 PROCEDURE — 25000132 ZZH RX MED GY IP 250 OP 250 PS 637: Performed by: PEDIATRICS

## 2018-02-19 PROCEDURE — 94640 AIRWAY INHALATION TREATMENT: CPT

## 2018-02-19 PROCEDURE — 83605 ASSAY OF LACTIC ACID: CPT | Performed by: FAMILY MEDICINE

## 2018-02-19 PROCEDURE — 80048 BASIC METABOLIC PNL TOTAL CA: CPT | Performed by: FAMILY MEDICINE

## 2018-02-19 PROCEDURE — 36415 COLL VENOUS BLD VENIPUNCTURE: CPT | Performed by: FAMILY MEDICINE

## 2018-02-19 PROCEDURE — 25000128 H RX IP 250 OP 636: Performed by: FAMILY MEDICINE

## 2018-02-19 PROCEDURE — 12000000 ZZH R&B MED SURG/OB

## 2018-02-19 PROCEDURE — 99232 SBSQ HOSP IP/OBS MODERATE 35: CPT | Performed by: FAMILY MEDICINE

## 2018-02-19 RX ORDER — PREDNISONE 20 MG/1
40 TABLET ORAL DAILY
Status: DISCONTINUED | OUTPATIENT
Start: 2018-02-19 | End: 2018-02-20 | Stop reason: HOSPADM

## 2018-02-19 RX ORDER — SULFAMETHOXAZOLE/TRIMETHOPRIM 800-160 MG
1 TABLET ORAL 2 TIMES DAILY
Status: DISCONTINUED | OUTPATIENT
Start: 2018-02-19 | End: 2018-02-20 | Stop reason: HOSPADM

## 2018-02-19 RX ORDER — SODIUM CHLORIDE 9 MG/ML
INJECTION, SOLUTION INTRAVENOUS CONTINUOUS
Status: DISCONTINUED | OUTPATIENT
Start: 2018-02-19 | End: 2018-02-19 | Stop reason: CLARIF

## 2018-02-19 RX ADMIN — AMOXICILLIN AND CLAVULANATE POTASSIUM 1 TABLET: 875; 125 TABLET, FILM COATED ORAL at 20:18

## 2018-02-19 RX ADMIN — AMOXICILLIN AND CLAVULANATE POTASSIUM 1 TABLET: 875; 125 TABLET, FILM COATED ORAL at 09:32

## 2018-02-19 RX ADMIN — SODIUM CHLORIDE 500 ML: 9 INJECTION, SOLUTION INTRAVENOUS at 10:02

## 2018-02-19 RX ADMIN — VALPROIC ACID 250 MG: 250 SOLUTION ORAL at 14:16

## 2018-02-19 RX ADMIN — SODIUM CHLORIDE 500 ML: 9 INJECTION, SOLUTION INTRAVENOUS at 13:00

## 2018-02-19 RX ADMIN — TAZOBACTAM SODIUM AND PIPERACILLIN SODIUM 3.38 G: 375; 3 INJECTION, SOLUTION INTRAVENOUS at 04:04

## 2018-02-19 RX ADMIN — SODIUM CHLORIDE: 9 INJECTION, SOLUTION INTRAVENOUS at 12:49

## 2018-02-19 RX ADMIN — SULFAMETHOXAZOLE AND TRIMETHOPRIM 1 TABLET: 800; 160 TABLET ORAL at 20:18

## 2018-02-19 RX ADMIN — VALPROIC ACID 250 MG: 250 SOLUTION ORAL at 22:17

## 2018-02-19 RX ADMIN — SULFAMETHOXAZOLE AND TRIMETHOPRIM 1 TABLET: 800; 160 TABLET ORAL at 09:33

## 2018-02-19 RX ADMIN — PREDNISONE 40 MG: 20 TABLET ORAL at 09:33

## 2018-02-19 RX ADMIN — IPRATROPIUM BROMIDE AND ALBUTEROL SULFATE 3 ML: .5; 3 SOLUTION RESPIRATORY (INHALATION) at 09:58

## 2018-02-19 RX ADMIN — IPRATROPIUM BROMIDE AND ALBUTEROL SULFATE 3 ML: .5; 3 SOLUTION RESPIRATORY (INHALATION) at 20:25

## 2018-02-19 RX ADMIN — VALPROIC ACID 250 MG: 250 SOLUTION ORAL at 05:22

## 2018-02-19 RX ADMIN — ACETAMINOPHEN 650 MG: 650 SUPPOSITORY RECTAL at 09:33

## 2018-02-19 RX ADMIN — IPRATROPIUM BROMIDE AND ALBUTEROL SULFATE 3 ML: .5; 3 SOLUTION RESPIRATORY (INHALATION) at 02:47

## 2018-02-19 NOTE — PLAN OF CARE
Problem: Patient Care Overview  Goal: Plan of Care/Patient Progress Review  Outcome: Improving  Lungs are diminished and coarse. duonebs given as scheduled. O2 sats are 89-92% using 1L nc O2. Has tight harsh cough, nonproductive. No nonverbal indicators of pain. Has been incontinent of urine and stool. sta up in wheelchair last evening x2 hours. Vss, afebrile. Will continue with plan of care.

## 2018-02-19 NOTE — PROVIDER NOTIFICATION
Notified MD at 1205 PM regarding critical results read back.      Spoke with: Dr. Miller    Orders were obtained.    Comments: Lactic 3.0  Dimitry Pardo RN

## 2018-02-19 NOTE — PLAN OF CARE
Problem: Pneumonia (Adult)  Goal: Signs and Symptoms of Listed Potential Problems Will be Absent, Minimized or Managed (Pneumonia)  Signs and symptoms of listed potential problems will be absent, minimized or managed by discharge/transition of care (reference Pneumonia (Adult) CPG).   Outcome: Improving  Off of room air off and on today with sats in low to mid 90's. Sats are higher when pt is sleeping. Coarse lung sounds, but improve with coughing. Repositioned every two hours. Up to pt's wheelchair this evening. Ate well. Was given rectal Tylenol x1 for comfort/ warm skin (rectal temp was 100.9) and napped well. More alert today. Procal was 0.15. Will continue to monitor sats, lung sounds, temps, labs, safety.

## 2018-02-19 NOTE — PROGRESS NOTES
Talked the , Ev.  She stated that patient has been on O2 at the Group Home before.  They are able to manage her with O2 if needed when she discharges.    [facility] [phone]/ [facility] [phone]

## 2018-02-19 NOTE — PROGRESS NOTES
Patient is appearing much more comfortable this afternoon following tylenol given this morning and is currently weaned down to RA.  She ate very well for lunch.  Lactic acid has returned and is slightly elevated at 3.0 - will perform another fluid bolus and start 50 cc/hr maintenance fluids while awaiting the rest of the results.  Will recheck a lactic acid level tomorrow morning or sooner if increased concern for worsening infection.    Electronically Signed:  Adelina Miller MD

## 2018-02-19 NOTE — PROGRESS NOTES
SPIRITUAL HEALTH SERVICES  SPIRITUAL ASSESSMENT Progress Note  [facility]      Herbie Espinoza, Eucharistic  from [facility] [employer], provided a blessing to Mickie Landin.    Jimbo Mills M.Div., [facility]  Staff   Office tel: [phone]

## 2018-02-19 NOTE — PROGRESS NOTES
[facility] Progress Note          Assessment and Plan:   Assessment:   Principal Problem:    Pneumonia due to infectious organism    Assessment: Thought to be secondary to aspiration pneumonia and patient was high risk, however does live at a group home setting, therefore may have health care acquired component.  pro calcitonin has been trending downward and patient has been weaning off of O2.  Has needed 1/2 L overnight to maintain saturations    Plan: We will transition patient to oral Augmentin and Bactrim and transition steroids to be oral prednisone as well, continue to wean O2 supplementation as able and monitor closely for ongoing signs of clinical improvement.  Given the fever yesterday and high temp today, will proceed with work up as below to further evaluate.      Active Problems:    Sepsis (H) - fever, tachypnea, tachycardia    Assessment: patient did have a temperature of 100.3 currently and on review of chart had one of 100.9 yesterday morning after being fever free for 24 hours.  Patient continues to be improving from a respiratory standpoint and there is no other concern for worsening sepsis    Plan: We will give Tylenol ×1 now to improve patient's comfort, but will also perform blood cultures ×2, repeat pro calcitonin level, give a 500 cc fluid bolus now and obtain continue to monitor patient closely and transition to oral antibiotics as aboveCBC and lactic acid level.        Acute respiratory failure with hypoxia (H)    Assessment: secondary to pneumonia as above with oxygen needs continuing to decrease as above    Plan: Continue to wean O2 supplementation as tolerated      Dysphagia    Assessment: patient had video swallow study last year which showed ongoing aspiration with patient at high risk for aspiration pneumonia going forward, graduated from hospice two months ago as she was doing well.  Patient eating enthusiastically     Plan: Continue with current dysphagia diet      Infantile  cerebral palsy (H)    Assessment: chronic and stable    Plan: continue supportive cares      Epilepsy (H)    Assessment: previous history    Plan: continue Depakote      Mental retardation    Assessment: chronic and stable, patient non-verbal    Plan: continue supportive care      Esophageal reflux    Assessment: previous history however the patient does not take anything chronically    Plan: continue to monitor, no medications at this time.       # Pain Assessment:   Current Pain Score 2/18/2018 2/18/2018 2/18/2018   Patient currently in pain? sleeping: patient not able to self report no no   Pain score (0-10) - - -   - Mickie is unable to participate in a collaborative plan for pain management due to baseline cognitive function and non-verbal status - per nursing patient has not shown any non-verbal cues concerning for pain.   - Patient does not appear to be in pain - no medications are needed    VTE:  SCDs  Code Status:  DNR/DNI        Interval History:   Continues to improve slowly - is tolerating periods on RA at rest when in a good position and wide awake but needs 1/2-1L NC oxygen supplementation with sleep or when in certain positions.  Vital signs generally better apart from fever of 100.9 yesterday and temp of 100.3 this morning after patient had been fever free for 24 hours.  Eating and voiding well.  Tolerating medications without significant side effects.          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 Discharged 01/29/06     Osteoarthrosis, unspecified whether generalized or localized, unspecified site     Osteoarthritis     Pica      Pneumonia, organism unspecified(486)  "01/31/09     Rectal bleeding 6/1/2012     Stricture and stenosis of esophagus      Ulcer of ankle (H) 4/28/2011     Unspecified disease of respiratory system     Pulmonary fibrosis     Unspecified intellectual disabilities     Mental retardation     Urinary tract infection, site not specified     Recurrent            Physical Exam:   Blood pressure 139/77, pulse 91, temperature 99.5  F (37.5  C), temperature source Axillary, resp. rate 18, height 1.626 m (5' 4\"), weight 53.5 kg (117 lb 15.1 oz), SpO2 90 %.  Constitutional:   Awake, alert, appears more uncomfortable today - not smiling as much and seems more restless     Lungs:   No increased work of breathing, good air exchange, very fine crackles present but improved from yesterday     Cardiovascular:   Normal apical impulse, regular rate and rhythm, normal S1 and S2, no S3 or S4, and no murmur noted     Abdomen:   Bowel sounds present, abdomen soft     Musculoskeletal:   No lower extremity pitting edema noted, ongoing baseline rigidity and tremor present     Neurologic:   Awake, alert, non-verbal, still making eye contact but not as social and appears slightly uncomfortable     Skin:   Skin is very warm to the touch             Data:   All laboratory data reviewed    Attestation:  I have reviewed today's vital signs, notes, medications, labs and imaging.     Electronically Signed:  Adelina Miller MD    Note: Chart documentation done in part with Dragon Voice Recognition software. Although reviewed after completion, some word and grammatical errors may remain.       "

## 2018-02-20 VITALS
BODY MASS INDEX: 20.14 KG/M2 | SYSTOLIC BLOOD PRESSURE: 155 MMHG | OXYGEN SATURATION: 90 % | WEIGHT: 117.95 LBS | HEIGHT: 64 IN | DIASTOLIC BLOOD PRESSURE: 69 MMHG | RESPIRATION RATE: 20 BRPM | TEMPERATURE: 99.4 F | HEART RATE: 89 BPM

## 2018-02-20 LAB
ANION GAP SERPL CALCULATED.3IONS-SCNC: 6 MMOL/L (ref 3–14)
BUN SERPL-MCNC: 19 MG/DL (ref 7–30)
CALCIUM SERPL-MCNC: 8.8 MG/DL (ref 8.5–10.1)
CHLORIDE SERPL-SCNC: 104 MMOL/L (ref 94–109)
CO2 SERPL-SCNC: 32 MMOL/L (ref 20–32)
CREAT SERPL-MCNC: 0.64 MG/DL (ref 0.52–1.04)
ERYTHROCYTE [DISTWIDTH] IN BLOOD BY AUTOMATED COUNT: 15.4 % (ref 10–15)
GFR SERPL CREATININE-BSD FRML MDRD: 89 ML/MIN/1.7M2
GLUCOSE SERPL-MCNC: 83 MG/DL (ref 70–99)
HCT VFR BLD AUTO: 41.1 % (ref 35–47)
HGB BLD-MCNC: 12.8 G/DL (ref 11.7–15.7)
LACTATE BLD-SCNC: 1.6 MMOL/L (ref 0.7–2)
MCH RBC QN AUTO: 30.2 PG (ref 26.5–33)
MCHC RBC AUTO-ENTMCNC: 31.1 G/DL (ref 31.5–36.5)
MCV RBC AUTO: 97 FL (ref 78–100)
PLATELET # BLD AUTO: 298 10E9/L (ref 150–450)
POTASSIUM SERPL-SCNC: 3.9 MMOL/L (ref 3.4–5.3)
PROCALCITONIN SERPL-MCNC: <0.05 NG/ML
RBC # BLD AUTO: 4.24 10E12/L (ref 3.8–5.2)
SODIUM SERPL-SCNC: 142 MMOL/L (ref 133–144)
WBC # BLD AUTO: 7.9 10E9/L (ref 4–11)

## 2018-02-20 PROCEDURE — 25000132 ZZH RX MED GY IP 250 OP 250 PS 637: Performed by: FAMILY MEDICINE

## 2018-02-20 PROCEDURE — 83605 ASSAY OF LACTIC ACID: CPT | Performed by: FAMILY MEDICINE

## 2018-02-20 PROCEDURE — 84145 PROCALCITONIN (PCT): CPT | Performed by: FAMILY MEDICINE

## 2018-02-20 PROCEDURE — 36415 COLL VENOUS BLD VENIPUNCTURE: CPT | Performed by: FAMILY MEDICINE

## 2018-02-20 PROCEDURE — 25000132 ZZH RX MED GY IP 250 OP 250 PS 637: Performed by: PEDIATRICS

## 2018-02-20 PROCEDURE — 85027 COMPLETE CBC AUTOMATED: CPT | Performed by: FAMILY MEDICINE

## 2018-02-20 PROCEDURE — 94640 AIRWAY INHALATION TREATMENT: CPT

## 2018-02-20 PROCEDURE — 25000125 ZZHC RX 250: Performed by: FAMILY MEDICINE

## 2018-02-20 PROCEDURE — 99239 HOSP IP/OBS DSCHRG MGMT >30: CPT | Performed by: FAMILY MEDICINE

## 2018-02-20 PROCEDURE — 80048 BASIC METABOLIC PNL TOTAL CA: CPT | Performed by: FAMILY MEDICINE

## 2018-02-20 RX ORDER — SULFAMETHOXAZOLE/TRIMETHOPRIM 800-160 MG
1 TABLET ORAL 2 TIMES DAILY
Qty: 7 TABLET | Refills: 0 | Status: SHIPPED | OUTPATIENT
Start: 2018-02-20 | End: 2018-02-24

## 2018-02-20 RX ORDER — IPRATROPIUM BROMIDE AND ALBUTEROL SULFATE 2.5; .5 MG/3ML; MG/3ML
3 SOLUTION RESPIRATORY (INHALATION) EVERY 6 HOURS
Qty: 360 ML | Refills: 0 | Status: SHIPPED | OUTPATIENT
Start: 2018-02-20

## 2018-02-20 RX ORDER — PREDNISONE 20 MG/1
40 TABLET ORAL DAILY
Qty: 14 TABLET | Refills: 0 | Status: SHIPPED | OUTPATIENT
Start: 2018-02-21 | End: 2018-07-31

## 2018-02-20 RX ADMIN — AMOXICILLIN AND CLAVULANATE POTASSIUM 1 TABLET: 875; 125 TABLET, FILM COATED ORAL at 09:09

## 2018-02-20 RX ADMIN — PREDNISONE 40 MG: 20 TABLET ORAL at 09:09

## 2018-02-20 RX ADMIN — IPRATROPIUM BROMIDE AND ALBUTEROL SULFATE 3 ML: .5; 3 SOLUTION RESPIRATORY (INHALATION) at 11:30

## 2018-02-20 RX ADMIN — VALPROIC ACID 250 MG: 250 SOLUTION ORAL at 05:31

## 2018-02-20 RX ADMIN — SULFAMETHOXAZOLE AND TRIMETHOPRIM 1 TABLET: 800; 160 TABLET ORAL at 09:09

## 2018-02-20 RX ADMIN — IPRATROPIUM BROMIDE AND ALBUTEROL SULFATE 3 ML: .5; 3 SOLUTION RESPIRATORY (INHALATION) at 02:54

## 2018-02-20 NOTE — PLAN OF CARE
Problem: Patient Care Overview  Goal: Plan of Care/Patient Progress Review  Outcome: Improving  Patient showed no non-verbal signs of pain through the night. Patient has some expiratory wheezes on the left upper side and lung sound are otherwise diminished. Patient on room air with O2 saturations in the low 90%s. Vital signs are stable, afebrile. Will continue to monitor.

## 2018-02-20 NOTE — PROGRESS NOTES
Care Coordinator- Discharge Planning     Admission Date/Time:  2/14/2018  Attending MD:  Adelina Miller*     Data  Date of initial CC assessment:  2/15/2018  Chart reviewed, discussed with interdisciplinary team.   Patient was admitted for:   1. Sepsis, due to unspecified organism (H)    2. Aspiration pneumonia, unspecified aspiration pneumonia type, unspecified laterality, unspecified part of lung (H)    3. COPD exacerbation (H)    4. Dehydration    5. Hypoglycemia         Assessment  Full assessment completed in previous note        Plan  Anticipated Discharge Date:  2/20/2018  Anticipated Discharge Plan:  Return to Group home; Guardian Aleta Martino updated of plan    CTS Handoff completed:  YES    Nanci Flores, MSN, RN, Care Coordinator  [facility] [phone]  [facility] [phone]

## 2018-02-20 NOTE — PLAN OF CARE
Problem: Pneumonia (Adult)  Goal: Signs and Symptoms of Listed Potential Problems Will be Absent, Minimized or Managed (Pneumonia)  Signs and symptoms of listed potential problems will be absent, minimized or managed by discharge/transition of care (reference Pneumonia (Adult) CPG).   Outcome: Improving  Has been on room air for the past 8 hours to sat 88-94%. Lungs with crackles and occasional wheeze. Ate well. Occasionally put her hand in her mouth, but was easily redirected/ distracted. Repositioned every two hours. Was incontinent of urine and smear of stool. Bed alarm on. Will continue to monitor sats, lung sounds, safety.

## 2018-02-20 NOTE — DISCHARGE SUMMARY
[facility] Discharge Summary    Mickie Landin MRN# [id number]   Age: 78 year old YOB: 1940     Date of Admission:  2/14/2018  Date of Discharge::  2/20/2018  Admitting Physician:  Adelina Miller MD  Discharge Physician:  Adelina Miller MD          Admission Diagnoses:   Dehydration [E86.0]  Hypoglycemia [E16.2]  COPD exacerbation (H) [J44.1]  Aspiration pneumonia, unspecified aspiration pneumonia type, unspecified laterality, unspecified part of lung (H) [J69.0]          Discharge Diagnosis:   Principal Problem:    Pneumonia due to infectious organism    Assessment:   Patient initially treated with Zosyn and vancomycin with clinical improvement and was transitioned to oral Augmentin and Bactrim with ongoing improvement not patient has been off O2 supplementation and is back to clinical baselineed.      Plan: We will discharge with course completion of Augmentin and Bactrim, continue support cares at the [facility] facility.    Active Problems:    Sepsis (H) - fever, tachypnea, tachycardia    Assessment:  Secondary to pneumonia, with symptoms now resolved    Plan:  Discharge with plan as above      COPD exacerbation    Assessment:   Suspected initially with patient having wheezing and tightness with underlying COPD.  Patient did respond well to steroids and nebulization with transition to oral steroids with decreased DuoNeb scheduled frequency with ongoing clinical improvement    Plan:   Discharge with ongoing steroid burst and prolonged taper as well as scheduled DuoNeb and as needed albuterol nebs      Acute respiratory failure with hypoxia (H)    Assessment: Secondary to pneumonia and possible COPD exacerbation as above with patient weaned off O2 supplementation    Plan:   discharge with plan as above       Dysphagia    Assessment: Ongoing and significant, with patient on appropriate diet but at high risk for aspiration pneumonia going forward    Plan: Discharge  without change to home diet      Infantile cerebral palsy (H)    Assessment: chronic and stable    Plan: Discharge with ongoing supportive care at the group home facility      Epilepsy (H)    Assessment: On Depakote    Plan: Discharge without change home regimen      Mental retardation    Assessment: Chronic and stable    Plan: Discharge back to group home setting      Esophageal reflux    Assessment:  Previous history.  Patient is not on any medications for this and does not show any symptoms concerning for discomfort.    Plan: No medication needed at time of discharge    # Discharge Pain Plan:   - Patient currently has NO PAIN and is not being prescribed pain medications on discharge.            Procedures:   No procedures performed during this admission          Medications Prior to Admission:     Prescriptions Prior to Admission   Medication Sig Dispense Refill Last Dose     acetaminophen (TYLENOL) 325 MG tablet Take 325-650 mg by mouth   2/14/2018 at 0930     MELATONIN PO    2/13/2018 at 2000     albuterol (2.5 MG/3ML) 0.083% nebulizer solution Take 3 mLs (2.5 mg) by nebulization every 6 hours as needed for shortness of breath / dyspnea 30 vial 0 2/14/2018 at 1430     THICK-IT #2 OR Add to all liquids   2/13/2018 at Unknown time     DEPAKOTE SPRINKLES 125 MG OR CPSP 2 CAPSULES three times DAILY   2/14/2018 at 1200     bismuth subsalicylate (PEPTO BISMOL) 262 MG/15ML suspension Take 15-30 mLs by mouth   More than a month at Unknown time     Calamine-Zinc Oxide (RA CALAMINE) 6.971-6.971 % LOTN Apply 1 Application topically   More than a month at Unknown time     carbamide peroxide (EAR DROPS) 6.5 % otic solution Place 5-10 drops in ear(s)   More than a month at Unknown time     docosanol (ABREVA) 10 % CREA cream Apply 1 Application topically   More than a month at Unknown time     loratadine (CLARITIN) 10 MG tablet    More than a month at Unknown time     nystatin (MYCOSTATIN) cream Apply 1 Application topically    More than a month at Unknown time     order for DME coloplast Critic Aid skin paste, thick moisture barrier paste, apply to buttocks twice daily and as needed.   More than a month at Unknown time     BUTT PASTE - REGULAR (DR LOVE POOP GOOP BUTT PASTE FORMULA) Apply topically 2 times daily Apply to buttocks wound BID and prn. 16 oz 1 More than a month at Unknown time     order for DME Equipment being ordered: Interdry Roll 1 Box 3      order for DME Equipment being ordered: Interdry roll.  Apply to buttock crease daily and PRN 1 Box 3      order for DME Equipment being ordered: 4x6 tegaderm  - Apply to buttock daily 1 Box 3      order for DME Equipment being ordered: Nu skin - apply to sacral area daily and prn 1 Box 3      ORDER FOR DME Equipment being ordered: nebulizer mask 2 Device 1      ORDER FOR DME Equipment being ordered: bed rails 1 Device 0      polyethylene glycol (MIRALAX) powder Take 17 g by mouth daily. 510 g 1 Unknown at Unknown time     BIOTENE PBF DRY MOUTH DT PSTE use twice daily   More than a month at Unknown time             Discharge Medications:     Current Discharge Medication List      START taking these medications    Details   ipratropium - albuterol 0.5 mg/2.5 mg/3 mL (DUONEB) 0.5-2.5 (3) MG/3ML neb solution Take 1 vial (3 mLs) by nebulization every 6 hours  Qty: 360 mL, Refills: 0    Associated Diagnoses: COPD exacerbation (H)      predniSONE (DELTASONE) 20 MG tablet Take 2 tablets (40 mg) by mouth daily x4 days, then 1 tablet daily x4 days, then 0.5 tab daily x4 days, then stop  Qty: 14 tablet, Refills: 0    Associated Diagnoses: COPD exacerbation (H); Sepsis, due to unspecified organism (H)      sulfamethoxazole-trimethoprim (BACTRIM DS/SEPTRA DS) 800-160 MG per tablet Take 1 tablet by mouth 2 times daily for 7 doses  Qty: 7 tablet, Refills: 0    Associated Diagnoses: Sepsis, due to unspecified organism (H)      amoxicillin-clavulanate (AUGMENTIN) 875-125 MG per tablet Take 1 tablet by  mouth every 12 hours for 7 doses  Qty: 7 tablet, Refills: 0    Associated Diagnoses: Aspiration pneumonia, unspecified aspiration pneumonia type, unspecified laterality, unspecified part of lung (H); Sepsis, due to unspecified organism (H)         CONTINUE these medications which have NOT CHANGED    Details   acetaminophen (TYLENOL) 325 MG tablet Take 325-650 mg by mouth      MELATONIN PO       albuterol (2.5 MG/3ML) 0.083% nebulizer solution Take 3 mLs (2.5 mg) by nebulization every 6 hours as needed for shortness of breath / dyspnea  Qty: 30 vial, Refills: 0    Associated Diagnoses: Wheezing      THICK-IT #2 OR Add to all liquids      DEPAKOTE SPRINKLES 125 MG OR CPSP 2 CAPSULES three times DAILY      bismuth subsalicylate (PEPTO BISMOL) 262 MG/15ML suspension Take 15-30 mLs by mouth      Calamine-Zinc Oxide (RA CALAMINE) 6.971-6.971 % LOTN Apply 1 Application topically      carbamide peroxide (EAR DROPS) 6.5 % otic solution Place 5-10 drops in ear(s)      docosanol (ABREVA) 10 % CREA cream Apply 1 Application topically      loratadine (CLARITIN) 10 MG tablet       nystatin (MYCOSTATIN) cream Apply 1 Application topically      !! order for DME coloplast Critic Aid skin paste, thick moisture barrier paste, apply to buttocks twice daily and as needed.      BUTT PASTE - REGULAR (DR LOVE POOP GOOP BUTT PASTE FORMULA) Apply topically 2 times daily Apply to buttocks wound BID and prn.  Qty: 16 oz, Refills: 1    Associated Diagnoses: Open wound of sacroiliac region without complication      !! order for DME Equipment being ordered: Interdry Roll  Qty: 1 Box, Refills: 3    Associated Diagnoses: Buttock wound, right, initial encounter; Buttock wound, left, initial encounter      !! order for DME Equipment being ordered: Interdry roll.  Apply to buttock crease daily and PRN  Qty: 1 Box, Refills: 3    Associated Diagnoses: Buttock wound, right, initial encounter; Buttock wound, left, initial encounter      !! order for DME  Equipment being ordered: 4x6 tegaderm  - Apply to buttock daily  Qty: 1 Box, Refills: 3    Associated Diagnoses: Buttock wound, right, initial encounter; Buttock wound, left, initial encounter      !! order for DME Equipment being ordered: Nu skin - apply to sacral area daily and prn  Qty: 1 Box, Refills: 3    Associated Diagnoses: Buttock wound, right, initial encounter; Buttock wound, left, initial encounter      !! ORDER FOR DME Equipment being ordered: nebulizer mask  Qty: 2 Device, Refills: 1    Associated Diagnoses: Wheezing; SOB (shortness of breath)      !! ORDER FOR DME Equipment being ordered: bed rails  Qty: 1 Device, Refills: 0    Associated Diagnoses: Infantile cerebral palsy, unspecified; Unspecified epilepsy without mention of intractable epilepsy; Unspecified intellectual disabilities; Osteoarthrosis, unspecified whether generalized or localized, unspecified site; Essential and other specified forms of tremor; Osteopenia; Spinal stenosis      polyethylene glycol (MIRALAX) powder Take 17 g by mouth daily.  Qty: 510 g, Refills: 1      BIOTENE PBF DRY MOUTH DT PSTE use twice daily       !! - Potential duplicate medications found. Please discuss with provider.                Consultations:   No consultations were requested during this admission          Brief History of Illness:   Patient is an 78-year-old female who presented from her group home with worsening shortness of breath cough, and wheezing with O2 saturations dropping into the low 80 range.  Chest x-ray showed a left-sided infiltrate, and patient had a fever of 100.4.  Influenza testing was negative.  Patient does have known severe dysphasia and is at high risk for aspiration pneumonia.  There is also pneumonia going around in the group home setting which patient has been exposed to.  In the emergency room patient was given steroids, broad-spectrum antibiotics with Zosyn and vancomycin, several nebulization but required ongoing O2  supplementation.  Decision was made to admit patient for ongoing management          Hospital Course:    In the hospital patient was continued on Zosyn and Vanco as well as IV steroids and scheduled duo nebs with as needed albuterol nebs.  She did have progressive improvement of her symptoms.  Pro-calcitonin level initially was 1.20 and is <0.05 at time of discharge.  She was weaned off O2 supplementation.  She was steroids and antibiotics without complication and is discharged back to the group home in stable condition.  Transition to oral          Physical Exam:   Vitals were reviewed  Constitutional:   awake, alert, at baseline cognition, no apparent distress, and appears stated age     Lungs:   No increased work of breathing, good air exchange, clear to auscultation bilaterally, no crackles or wheezing     Cardiovascular:   Normal apical impulse, regular rate and rhythm, normal S1 and S2, no S3 or S4, and no murmur noted     Abdomen:   Bowel sounds present, abdomen soft     Musculoskeletal:   Baseline muscle status present with rigidity and intermittent tremor noted     Neurologic:   Awake, alert, at baseline cognition     Skin:   Patient does have some skin irritation on her right hand and lower jaw, from patient sucking and biting her hand, which apparently is a normal behavior for her in the group home setting.  Neither skin irritated region appears infected.              Discharge Instructions and Follow-Up:   Discharge diet: Dysphasia diet with puréed liquids   Discharge activity: Activity as tolerated   Discharge follow-up: Follow up with primary care provider within 7 days           Discharge Disposition:   Discharged to group home      Attestation:  I have reviewed today's vital signs, notes, medications, labs and imaging.    More than 30 minutes was spent on this discharge.      Adelina Miller MD    Note: Chart documentation done in part with Dragon Voice Recognition software. Although  reviewed after completion, some word and grammatical errors may remain.

## 2018-02-20 NOTE — PROGRESS NOTES
Mickie Landin  Gender: female  : 1940  [id number]  [facility] [id number] [phone] (home)     Medical Record: [id number]  Pharmacy:    [facility] - [place], [place] - [address]  [employer] [facility] [employer] ONLY MAIL ORDER  Primary Care Provider: Indira Choudhary    Parent's names are: Data Unavailable (mother) and Data Unavailable (father).      [facility]  2018     Discharge Phone Call:  Key Words/Key Times    Discharged to Group Home  Dimitry Pardo RN

## 2018-02-20 NOTE — PROGRESS NOTES
S-(situation): Patient discharged to [facility]  via care provider with their vehicle.    B-(background): Pneumonia    A-(assessment): VSS, RA, afebrile. Lungs clear and diminished with exp. Wheezes. Tolerating diet and was feed for lunch and breakfast. patient was incontinent x3 this shift and has a small rash on the lower chin.    Patient has a red blanchable buttock and heel protectors with skin intact and reddened blanchable heals.    Last bowel movement:2/20/18     R-(recommendations):Report called to careprovider when picking up the patient. Listed belongings gathered and sent with patient.     Discharge Nursing Criteria:     Care Plan and Patient education resolved:     Core Measures   Core Measures applicable during stay (Stroke/TIA, MI, Pneumonia and SSI): Yes    Vaccines  Influenza status verified at discharge:  Yes    MISC  Home and hospital aquired medications returned to patient: Yes  Medication Bin checked and emptied on discharge Yes  All paperwork sent with patient/Copy of AVS given to patient or family Yes.

## 2018-02-24 LAB
BACTERIA SPEC CULT: NORMAL
BACTERIA SPEC CULT: NORMAL
Lab: NORMAL
Lab: NORMAL
SPECIMEN SOURCE: NORMAL
SPECIMEN SOURCE: NORMAL

## 2018-07-25 ENCOUNTER — HOSPITAL ENCOUNTER (OUTPATIENT)
Dept: GENERAL RADIOLOGY | Facility: CLINIC | Age: 78
Discharge: HOME | End: 2018-07-25
Admitting: RADIOLOGY
Payer: COMMERCIAL

## 2018-07-25 ENCOUNTER — HOSPITAL ENCOUNTER (OUTPATIENT)
Dept: SPEECH THERAPY | Facility: CLINIC | Age: 78
End: 2018-07-25
Attending: NURSE PRACTITIONER
Payer: COMMERCIAL

## 2018-07-25 DIAGNOSIS — R13.10 DYSPHAGIA, UNSPECIFIED TYPE: ICD-10-CM

## 2018-07-25 PROCEDURE — 92611 MOTION FLUOROSCOPY/SWALLOW: CPT | Mod: GN | Performed by: SPEECH-LANGUAGE PATHOLOGIST

## 2018-07-25 PROCEDURE — 40000211 ZZHC STATISTIC SLP  DEPARTMENT VISIT: Performed by: SPEECH-LANGUAGE PATHOLOGIST

## 2018-07-25 PROCEDURE — 74230 X-RAY XM SWLNG FUNCJ C+: CPT | Mod: TC

## 2018-07-26 NOTE — PROGRESS NOTES
"   07/25/18 1400      Comments Caregiver present to interpret body language   General Information   Type Of Visit Initial   Start Of Care Date 07/25/18   Referring Physician Elizabeth Garrido NP   Orders Other   Orders Comment XR Video Swallow without Esophagram   Medical Diagnosis Aspiration Pneumonia   Onset Of Illness/injury Or Date Of Surgery 07/22/18   Precautions/limitations Swallowing Precautions;Other (see Comments)  (Not able to follow commands)   Hearing WFLs   Pertinent History of Current Problem/OT: Additional Occupational Profile Info Patient is an 78 year old woman with a history of recurrent pneumonia.   Additional diagnosis' include but is not limited to MR, epilepsy, CP, Constipation, pulmonary fibrosis, and stricture and stenosis of esophagus.  Caregiver reports patient is currently on pureed food diet and pudding thick liquids.  Pills are crushed and given via food carrier.  Reviewed reports from [facility] on 06/21/2017 and radiologist reported \"The oral and pharyngeal phase of swalloing are normal.  One episode of penetration with nectar thick barium.  No aspiration.\"   Patient Role/employment History Disabled   Living Environment Group Home   General Observations Patient is small in stature and slightly leaning forward in wheelchair.  Saliva bubbles dripping from right corner of buccal area down to level of chin.  Working mouth back and forth.  Opened mouth for trials.  Caregiver explained feeding strategy used at group home to increase overall swallowing function which is \"very large bites one right after another which is the only way she can swallow.\"   Patient/family Goals \"We want to see what she is doing when she swallows because it's changed in the last 2 weeks.  Sh'e not swallowing as well at all.  Is it the texture? The taste?\"   General Information Comments Caregiver present throughout evaluation as needed.  Reports she has been present during previous swallow studies.  Reports that " discussions of alternative means of nutrition will be completed with family as needed.     Clinical Swallow Evaluation   Oral Musculature unable to assess due to poor participation/comprehension   Dentition edentulous, does not have dentures   Secretion Management right corner drooling   Oral Musculature Comments Unable to determine due to inability to follow directions.   Limited clinical observation.   Additional Documentation Yes   Additional evaluation(s) completed today Yes;Recommended   Rationale for completing additional evaluation To assess function of swallowing mechanism and rule out aspiration and determine safest diet level   VFSS Evaluation   VFSS Additional Documentation Yes   VFSS Eval: Pudding Thick Liquid Texture Trial   Mode of Presentation, Pudding spoon;fed by clinician;other (see comments)  (large bites per caregiver direction)   Order of Presentation first and only presentation   Preparatory Phase Holding;Poor bolus control;Insufficient mastication   Oral Phase, Pudding Poor AP movement;Residue in oral cavity;Premature pharyngeal entry   Pharyngeal Phase, Pudding Delayed swallow reflex;Pharyngeal wall coating;Residue in valleculae;Residue in pyriform sinus   Rosenbek's Penetration Aspiration Scale: Pudding-Thick Liquid Trial Results 1 - no aspiration, contrast does not enter airway   Successful Strategies Trialed During Procedure, Pudding other (see comments)  (patient unable to follow directions)   Diagnostic Statement Patient presents with severe oropharyngeal dysphagia characterized by poor bolus control, significant oral residual, significantly decreased AP propulsion, premature spillage of bolus over base of tongue prior to initiation of the swallow and significant pharyngeal residual.  Epiglottis is tipping due to weight of bolus and little anterior hyoid movement noted with minimal pharyngeal stripping wave.  Patient is at significant risk of aspiration with all oral intake.  If oral  intake is to be continued, recommend pureed foods and pudding thick liquids with bites no larger than 1.5 teaspoons, waiting for patient to swallow between bites and patient to remain upright for 30 minutes following intake to allow time for spontaneous clearance of oral and pharyngeal areas to minimize risk of aspiration.     Swallow Compensations   Swallow Compensations Reduce amounts   Results Other (see comments)  (no change noted)   Educational Assessment   Barriers to Learning Cognitive   Esophageal Phase of Swallow   Esophageal sweep performed during today s vidofluoroscopic exam  No;Please refer to radiologist's report for details   Swallow Eval: Clinical Impressions   Skilled Criteria for Therapy Intervention No significant expected  improvement in functional status   Functional Assessment Scale (FAS) 1   Dysphagia Outcome Severity Scale (STONEY) Level 1 - STONEY   Diet texture recommendations NPO;Dysphagia diet level 3;Pudding thick liquids   Recommended Feeding/Eating Techniques small sips/bites;other (see comments)  (slower pace)   Risks and Benefits of Treatment have been explained. Yes   Patient, family and/or staff in agreement with Plan of Care Yes   Clinical Impression Comments Patient presents with severe oropharyngeal dyspahgia and is at significant risk of aspiration with oral intake.  Recommend NPO status to meet nutritional needs.  If oral intake is to continue, recommend diet of pudding thick liquids and pureed foods with pills crushed and placed in food carrier such as applesauce.  Recommend bites of 1.5 teaspoon with pause between bites for swallow and for patient to remain upright at least 30 minutes following oral intake to allow time for spontaneous clearance of oral and pharyngeal areas.     Total Session Time   Total Session Time 50   Total Evaluation Time 50

## 2018-07-31 ENCOUNTER — HOSPITAL ENCOUNTER (EMERGENCY)
Facility: CLINIC | Age: 78
Discharge: HOME | End: 2018-07-31
Attending: FAMILY MEDICINE | Admitting: FAMILY MEDICINE
Payer: COMMERCIAL

## 2018-07-31 ENCOUNTER — APPOINTMENT (OUTPATIENT)
Dept: GENERAL RADIOLOGY | Facility: CLINIC | Age: 78
End: 2018-07-31
Attending: FAMILY MEDICINE
Payer: COMMERCIAL

## 2018-07-31 VITALS
HEART RATE: 84 BPM | WEIGHT: 110 LBS | RESPIRATION RATE: 24 BRPM | TEMPERATURE: 98 F | DIASTOLIC BLOOD PRESSURE: 85 MMHG | BODY MASS INDEX: 18.88 KG/M2 | OXYGEN SATURATION: 86 % | SYSTOLIC BLOOD PRESSURE: 129 MMHG

## 2018-07-31 DIAGNOSIS — G80.9 INFANTILE CEREBRAL PALSY (H): ICD-10-CM

## 2018-07-31 DIAGNOSIS — Z87.01 HISTORY OF PNEUMONIA: ICD-10-CM

## 2018-07-31 DIAGNOSIS — F79 MENTAL RETARDATION: ICD-10-CM

## 2018-07-31 DIAGNOSIS — R09.89 CHOKING EPISODE: ICD-10-CM

## 2018-07-31 LAB
ALBUMIN SERPL-MCNC: 2.4 G/DL (ref 3.4–5)
ALP SERPL-CCNC: 84 U/L (ref 40–150)
ALT SERPL W P-5'-P-CCNC: 11 U/L (ref 0–50)
ANION GAP SERPL CALCULATED.3IONS-SCNC: 8 MMOL/L (ref 3–14)
AST SERPL W P-5'-P-CCNC: 16 U/L (ref 0–45)
BASOPHILS # BLD AUTO: 0 10E9/L (ref 0–0.2)
BASOPHILS NFR BLD AUTO: 0.3 %
BILIRUB SERPL-MCNC: 0.2 MG/DL (ref 0.2–1.3)
BUN SERPL-MCNC: 8 MG/DL (ref 7–30)
CALCIUM SERPL-MCNC: 9 MG/DL (ref 8.5–10.1)
CHLORIDE SERPL-SCNC: 92 MMOL/L (ref 94–109)
CO2 SERPL-SCNC: 30 MMOL/L (ref 20–32)
CREAT SERPL-MCNC: 0.51 MG/DL (ref 0.52–1.04)
CRP SERPL-MCNC: 90.5 MG/L (ref 0–8)
DIFFERENTIAL METHOD BLD: NORMAL
EOSINOPHIL NFR BLD AUTO: 2.3 %
ERYTHROCYTE [DISTWIDTH] IN BLOOD BY AUTOMATED COUNT: 14.1 % (ref 10–15)
GFR SERPL CREATININE-BSD FRML MDRD: >90 ML/MIN/1.7M2
GLUCOSE SERPL-MCNC: 87 MG/DL (ref 70–99)
HCT VFR BLD AUTO: 38 % (ref 35–47)
HGB BLD-MCNC: 12.5 G/DL (ref 11.7–15.7)
IMM GRANULOCYTES # BLD: 0 10E9/L (ref 0–0.4)
IMM GRANULOCYTES NFR BLD: 0.4 %
LACTATE BLD-SCNC: 0.7 MMOL/L (ref 0.7–2)
LYMPHOCYTES # BLD AUTO: 1.8 10E9/L (ref 0.8–5.3)
LYMPHOCYTES NFR BLD AUTO: 22 %
MCH RBC QN AUTO: 29.2 PG (ref 26.5–33)
MCHC RBC AUTO-ENTMCNC: 32.9 G/DL (ref 31.5–36.5)
MCV RBC AUTO: 89 FL (ref 78–100)
MONOCYTES # BLD AUTO: 0.8 10E9/L (ref 0–1.3)
MONOCYTES NFR BLD AUTO: 10.3 %
NEUTROPHILS # BLD AUTO: 5.1 10E9/L (ref 1.6–8.3)
NEUTROPHILS NFR BLD AUTO: 64.7 %
NRBC # BLD AUTO: 0 10*3/UL
NRBC BLD AUTO-RTO: 0 /100
NT-PROBNP SERPL-MCNC: 478 PG/ML (ref 0–1800)
PLATELET # BLD AUTO: 281 10E9/L (ref 150–450)
POTASSIUM SERPL-SCNC: 4.3 MMOL/L (ref 3.4–5.3)
PROT SERPL-MCNC: 7.2 G/DL (ref 6.8–8.8)
RBC # BLD AUTO: 4.28 10E12/L (ref 3.8–5.2)
SODIUM SERPL-SCNC: 130 MMOL/L (ref 133–144)
WBC # BLD AUTO: 7.9 10E9/L (ref 4–11)

## 2018-07-31 PROCEDURE — 40000270 ZZH STATISTIC OXYGEN  O2DAILY TECH TIME

## 2018-07-31 PROCEDURE — 83605 ASSAY OF LACTIC ACID: CPT | Performed by: FAMILY MEDICINE

## 2018-07-31 PROCEDURE — 25000125 ZZHC RX 250

## 2018-07-31 PROCEDURE — 85025 COMPLETE CBC W/AUTO DIFF WBC: CPT | Performed by: FAMILY MEDICINE

## 2018-07-31 PROCEDURE — 25000128 H RX IP 250 OP 636: Performed by: FAMILY MEDICINE

## 2018-07-31 PROCEDURE — 71045 X-RAY EXAM CHEST 1 VIEW: CPT | Mod: TC

## 2018-07-31 PROCEDURE — 86140 C-REACTIVE PROTEIN: CPT | Performed by: FAMILY MEDICINE

## 2018-07-31 PROCEDURE — 96360 HYDRATION IV INFUSION INIT: CPT | Performed by: FAMILY MEDICINE

## 2018-07-31 PROCEDURE — 83880 ASSAY OF NATRIURETIC PEPTIDE: CPT | Performed by: FAMILY MEDICINE

## 2018-07-31 PROCEDURE — 40000274 ZZH STATISTIC RCP CONSULT EA 30 MIN

## 2018-07-31 PROCEDURE — 94640 AIRWAY INHALATION TREATMENT: CPT

## 2018-07-31 PROCEDURE — 40000275 ZZH STATISTIC RCP TIME EA 10 MIN

## 2018-07-31 PROCEDURE — 99284 EMERGENCY DEPT VISIT MOD MDM: CPT | Mod: Z6 | Performed by: FAMILY MEDICINE

## 2018-07-31 PROCEDURE — 80053 COMPREHEN METABOLIC PANEL: CPT | Performed by: FAMILY MEDICINE

## 2018-07-31 PROCEDURE — 99284 EMERGENCY DEPT VISIT MOD MDM: CPT | Mod: 25 | Performed by: FAMILY MEDICINE

## 2018-07-31 RX ORDER — IPRATROPIUM BROMIDE AND ALBUTEROL SULFATE 2.5; .5 MG/3ML; MG/3ML
SOLUTION RESPIRATORY (INHALATION)
Status: COMPLETED
Start: 2018-07-31 | End: 2018-07-31

## 2018-07-31 RX ORDER — IPRATROPIUM BROMIDE AND ALBUTEROL SULFATE 2.5; .5 MG/3ML; MG/3ML
3 SOLUTION RESPIRATORY (INHALATION)
Status: COMPLETED | OUTPATIENT
Start: 2018-07-31 | End: 2018-07-31

## 2018-07-31 RX ADMIN — SODIUM CHLORIDE 1000 ML: 9 INJECTION, SOLUTION INTRAVENOUS at 08:32

## 2018-07-31 RX ADMIN — IPRATROPIUM BROMIDE AND ALBUTEROL SULFATE 3 ML: 2.5; .5 SOLUTION RESPIRATORY (INHALATION) at 07:27

## 2018-07-31 RX ADMIN — IPRATROPIUM BROMIDE AND ALBUTEROL SULFATE 3 ML: .5; 3 SOLUTION RESPIRATORY (INHALATION) at 07:27

## 2018-07-31 NOTE — LETTER
July 31, 2018      To Whom It May Concern:      Mickie Landin was seen in our Emergency Department today, 07/31/18. Her labs and chest x-ray are all normal. She appears to be at her baseline. Return to the emergency department with any problems. No change in her medications or care recommended. Would recommend discussing hospice with family.        Sincerely,        Kaushal Ridley MD

## 2018-07-31 NOTE — ED PROVIDER NOTES
History     Chief Complaint   Patient presents with     Choking     HPI  Mickie Landin is a 78 year old female who presents to the emergency room after a probable choking episode. Per report the patient's lips turned blue 911 was summoned. The ambulance brought her in without incident. Her oxygen levels were around high 80s low 90s. The patient's caregiver states that she usually runs in the high 80s and does not receive any supplemental oxygen. The patient is DNR/DNI and was previously on hospice but was taken off of it. There is discussion actually this week about whether or not to have her back on hospice. There is a meeting with her sister planned. The patient had a recent swallow study and does not aspirate per the swallow study. The caregiver states that the patient has had chronic problems with her secretions but has always been able to cough it out. Suspect that it was secretions that she was gasping on this morning and has successfully cleared them. The caregiver/manager here with the patient now states that the staff last night/this morning is not as familiar with the patient as she is. This is not that uncommon for the patient.    Problem List:    Patient Active Problem List    Diagnosis Date Noted     Sepsis (H) - fever, tachypnea, tachycardia 02/15/2018     Priority: Medium     Acute respiratory failure with hypoxia (H) 02/15/2018     Priority: Medium     Pneumonia due to infectious organism 02/14/2018     Priority: Medium     Health Care Home 11/25/2014     Priority: Medium     No longer active with [facility] community case management effective 11/30/14.           Spinal stenosis 09/24/2013     Priority: Medium     Weight loss 06/01/2012     Priority: Medium     Fibrosis of lung (H) 05/24/2012     Priority: Medium     (Problem list name updated by automated process. 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  Document previously scanned on 12.  Order reviewed and found to be valid.  Code Status reflects choices in most recent ACP document. Documents received from [facility] indicate patient has a guardian (county) however no documents on file to confirm. Received two DHS Authorizations for code status; most recent  14. Please obtain copies of legal document at next opportunity. Confirmed/documented designated decision maker(s).  Added by Madai Corbin RN, System Director ACP-Honoring Choices                Chronic constipation 10/02/2011     Priority: Medium     Dysphagia 10/02/2011     Priority: Medium     Chronic rhinitis 2011     Priority: Medium     CARDIOVASCULAR SCREENING; LDL GOAL LESS THAN 130 10/31/2010     Priority: Medium     Osteopenia 2010     Priority: Medium     History of colon cancer 2010     Priority: Medium     Esophageal reflux 10/31/2001     Priority: Medium     Idiopathic interstitial pneumonia (H) 10/31/2001     Priority: Medium     Problem list name updated by automated process. Provider to review and confirm  [employer] Update utility       Infantile cerebral palsy (H)      Priority: Medium     Problem list name updated by automated process. Provider to review       Epilepsy (H)      Priority: Medium     Problem list name updated by automated process. Provider to review       Mental retardation      Priority: Medium     Problem list name updated by automated process. Provider to review       Osteoarthritis      Priority: Medium     Problem list name updated by automated process. Provider to review       Diaphragmatic hernia      Priority: Medium     Problem list name updated  by automated process. Provider to review       Essential and other specified forms of tremor      Priority: Medium        Past Medical History:    Past Medical History:   Diagnosis Date     Constipation      Contusion of thigh 08/10/09     Diaphragmatic hernia without mention of obstruction or gangrene      Epilepsy (H)      Essential and other specified forms of tremor      Infantile cerebral palsy, unspecified      Malignant neoplasm of descending colon (H) 01/23/06     Osteoarthrosis, unspecified whether generalized or localized, unspecified site      Pica      Pneumonia, organism unspecified(486) 01/31/09     Rectal bleeding 6/1/2012     Stricture and stenosis of esophagus      Ulcer of ankle (H) 4/28/2011     Unspecified disease of respiratory system      Unspecified intellectual disabilities      Urinary tract infection, site not specified        Past Surgical History:    Past Surgical History:   Procedure Laterality Date     C PART REMOVAL COLON W ANASTOMOSIS  01/23/06    Partial left colectomy with takedown of splenic flexure.     COLONOSCOPY  11/01/2006    Polypectomy     COLONOSCOPY  12/19/2007     COLONOSCOPY  12/19/08     COLONOSCOPY  04/06/10     COLONOSCOPY  4/1/2011    Procedure:COLONOSCOPY; Surgeon:MATT FERRARO; Location:[facility]     HC COLONOSCOPY W/WO BRUSH/WASH  11/23/2005    Polypectomy.       HC TOOTH EXTRACTION W/FORCEP  05/24/2004    Extraction of teeth #3,6,7,8,9,10,11, &12.  Two quadrant aveoloplasty.  [facility].       Family History:    Family History   Problem Relation Age of Onset     Cancer - colorectal Brother        Social History:  Marital Status:  Single [1]  Social History   Substance Use Topics     Smoking status: Never Smoker     Smokeless tobacco: Never Used      Comment: no smokers in the household     Alcohol use No        Medications:      BIOTENE PBF DRY MOUTH DT PSTE   DEPAKOTE SPRINKLES 125 MG OR CPSP   MELATONIN PO   THICK-IT #2 OR   acetaminophen (TYLENOL) 325 MG tablet    albuterol (2.5 MG/3ML) 0.083% nebulizer solution   bismuth subsalicylate (PEPTO BISMOL) 262 MG/15ML suspension   BUTT PASTE - REGULAR (DR LOVE POOP GOOP BUTT PASTE FORMULA)   Calamine-Zinc Oxide (RA CALAMINE) 6.971-6.971 % LOTN   carbamide peroxide (EAR DROPS) 6.5 % otic solution   docosanol (ABREVA) 10 % CREA cream   ipratropium - albuterol 0.5 mg/2.5 mg/3 mL (DUONEB) 0.5-2.5 (3) MG/3ML neb solution   loratadine (CLARITIN) 10 MG tablet   nystatin (MYCOSTATIN) cream   order for DME   order for DME   order for DME   order for DME   order for DME   ORDER FOR DME   ORDER FOR DME   polyethylene glycol (MIRALAX) powder         Review of Systems   Unable to perform ROS: Patient nonverbal       Physical Exam   BP: 126/65  Pulse: 87  Temp: 98  F (36.7  C)  Resp: 22  Weight: 49.9 kg (110 lb)  SpO2: (!) 86 %      Physical Exam   Constitutional: She is oriented to person, place, and time. She appears well-developed and well-nourished.   Awake, nonverbal, breathing at ease. Does not appear to be in any distress. Does not appear uncomfortable. Does not appear toxic or ill. Has an occasional wet cough. Handles her secretions and clears her airway without assistance. O2 sats in the high 80s to low 90s. Vital signs otherwise stable./65  Pulse 85  Temp 98  F (36.7  C) (Temporal)  Resp 22  Wt 49.9 kg (110 lb)  SpO2 91%  BMI 18.88 kg/m2     HENT:   Head: Normocephalic and atraumatic.   Right Ear: External ear normal.   Left Ear: External ear normal.   Mouth/Throat: Oropharynx is clear and moist.   Eyes: Conjunctivae are normal.   Neck: Normal range of motion. Neck supple.   Cardiovascular: Normal rate, regular rhythm and normal heart sounds.    Pulmonary/Chest: Effort normal and breath sounds normal.   Abdominal: Soft. Bowel sounds are normal.   Musculoskeletal: Normal range of motion.   Neurological: She is alert and oriented to person, place, and time.   Skin: Skin is warm and dry.   Psychiatric: She has a normal mood  and affect. Her behavior is normal.   Nursing note and vitals reviewed.      ED Course     ED Course     Procedures               Critical Care time:  none               Results for orders placed or performed during the hospital encounter of 07/31/18 (from the past 24 hour(s))   XR Chest Port 1 View    Narrative    XR CHEST PORT 1 VW 7/31/2018 8:15 AM    HISTORY: Hypoxia.    COMPARISON: 2/14/2018, 9/26/2011    FINDINGS: Chronic fibrosis or scar in the medial aspect of the left  lung base. No new airspace consolidation, pleural effusion or  pneumothorax. Normal heart size.      Impression    IMPRESSION: No acute abnormality.    JANES LINO MD   CBC with platelets differential   Result Value Ref Range    WBC 7.9 4.0 - 11.0 10e9/L    RBC Count 4.28 3.8 - 5.2 10e12/L    Hemoglobin 12.5 11.7 - 15.7 g/dL    Hematocrit 38.0 35.0 - 47.0 %    MCV 89 78 - 100 fl    MCH 29.2 26.5 - 33.0 pg    MCHC 32.9 31.5 - 36.5 g/dL    RDW 14.1 10.0 - 15.0 %    Platelet Count 281 150 - 450 10e9/L    Diff Method Automated Method     % Neutrophils 64.7 %    % Lymphocytes 22.0 %    % Monocytes 10.3 %    % Eosinophils 2.3 %    % Basophils 0.3 %    % Immature Granulocytes 0.4 %    Nucleated RBCs 0 0 /100    Absolute Neutrophil 5.1 1.6 - 8.3 10e9/L    Absolute Lymphocytes 1.8 0.8 - 5.3 10e9/L    Absolute Monocytes 0.8 0.0 - 1.3 10e9/L    Absolute Basophils 0.0 0.0 - 0.2 10e9/L    Abs Immature Granulocytes 0.0 0 - 0.4 10e9/L    Absolute Nucleated RBC 0.0    Comprehensive metabolic panel   Result Value Ref Range    Sodium 130 (L) 133 - 144 mmol/L    Potassium 4.3 3.4 - 5.3 mmol/L    Chloride 92 (L) 94 - 109 mmol/L    Carbon Dioxide 30 20 - 32 mmol/L    Anion Gap 8 3 - 14 mmol/L    Glucose 87 70 - 99 mg/dL    Urea Nitrogen 8 7 - 30 mg/dL    Creatinine 0.51 (L) 0.52 - 1.04 mg/dL    GFR Estimate >90 >60 mL/min/1.7m2    GFR Estimate If Black >90 >60 mL/min/1.7m2    Calcium 9.0 8.5 - 10.1 mg/dL    Bilirubin Total 0.2 0.2 - 1.3 mg/dL    Albumin 2.4 (L)  3.4 - 5.0 g/dL    Protein Total 7.2 6.8 - 8.8 g/dL    Alkaline Phosphatase 84 40 - 150 U/L    ALT 11 0 - 50 U/L    AST 16 0 - 45 U/L   Lactic acid whole blood   Result Value Ref Range    Lactic Acid 0.7 0.7 - 2.0 mmol/L   Nt probnp inpatient (BNP)   Result Value Ref Range    N-Terminal Pro BNP Inpatient 478 0 - 1800 pg/mL   CRP inflammation   Result Value Ref Range    CRP Inflammation 90.5 (H) 0.0 - 8.0 mg/L       Medications   ipratropium - albuterol 0.5 mg/2.5 mg/3 mL (DUONEB) neb solution 3 mL (3 mLs Nebulization Given 7/31/18 3628)   0.9% sodium chloride BOLUS (0 mLs Intravenous Stopped 7/31/18 7927)       Assessments & Plan (with Medical Decision Making)   MDM--78-year-old female who apparently had a choking episode this morning. Possible mucus plugging or aspiration of secretions. Airway she apparently occlude her airway and has done well since. The patient has been evaluated twice for swallowing studies and apparently is okay. She is however declining in general. I have discussed the patient's condition and CODE STATUS with the manager of the group home where the patient is taking care of. The patient was previously on hospice. There was a plan to discuss with her sister a return to hospice status even before this event. Patient usually handles her secretions without suction. There has been discussion in the past in regards to G-tube or 2 feedings. His been felt the patient would be a poor candidate for this. I encouraged the discussion in regards to hospice given the patient's chronic condition and apparent worsening status. I did not feel the patient needed any further evaluation or intervention. On discharge the patient is at status quo. She appears pain free and breathing without any difficulties.  I have reviewed the nursing notes.    I have reviewed the findings, diagnosis, plan and need for follow up with the patient.       Discharge Medication List as of 7/31/2018 10:14 AM          Final diagnoses:    Choking episode   Infantile cerebral palsy (H)   Mental retardation   History of pneumonia       7/31/2018   [facility] EMERGENCY DEPARTMENT     Khai, Kaushal DOMINGO MD  07/31/18 4001

## 2018-07-31 NOTE — ED AVS SNAPSHOT
[facility] Emergency Department    [address] [place] [address]    Phone:  [phone]    Fax:  [phone]                                       Mickie Landin   MRN: [id number]    Department:  [facility] Emergency Department   Date of Visit:  7/31/2018           After Visit Summary Signature Page     I have received my discharge instructions, and my questions have been answered. I have discussed any challenges I see with this plan with the nurse or doctor.    ..........................................................................................................................................  Patient/Patient Representative Signature      ..........................................................................................................................................  Patient Representative Print Name and Relationship to Patient    ..................................................               ................................................  Date                                            Time    ..........................................................................................................................................  Reviewed by Signature/Title    ...................................................              ..............................................  Date                                                            Time

## 2018-07-31 NOTE — PROGRESS NOTES
S- Respiratory Therapy  B- Hypoxia  A- Patient had an event this morning where she turned blue when the [facility] home was getting her out of bed.   Room air SpO2 85%.  Patient has a frequent cough, clearing of her throat.  Caregiver states that is a normal baseline cough.  Oxygen via nasal cannula is currently 1 liter with SpO2 = 94%.  Breath sounds coarse in Left lower lobe.   R- Breath sounds unchanged post nebulizer treatment.

## 2018-07-31 NOTE — ED AVS SNAPSHOT
[facility]    [address] [place] [address]    Phone:  [phone]    Fax:  [phone]                                       Mickie Landin   MRN: [id number]    Department:  [facility]   Date of Visit:  7/31/2018           Patient Information     Date Of Birth          1940        Your diagnoses for this visit were:     Choking episode     Infantile cerebral palsy (H)     Mental retardation     History of pneumonia        You were seen by Khai, Kaushal DOMINGO MD.      Follow-up Information     Follow up with Indira Choudhary PA-C.    Why:  As needed    Contact information:    [facility]  [address] [address]  [phone]          Follow up with [facility] Emergency [facility].    Specialty:  EMERGENCY MEDICINE    Why:  If symptoms worsen    Contact information:    [address] [place] [id number] [phone]    Additional information:    From [address]: Exit at [address] on south side of [place]. Turn right on [address]. Turn left at stoplight on [address]. [facility] will be in view two blocks ahead      24 Hour Appointment Hotline       To make an appointment at any [facility] clinic, call [phone] ([phone]). If you don't have a family doctor or clinic, we will help you find one. [facility] clinics are conveniently located to serve the needs of you and your family.             Review of your medicines      Our records show that you are taking the medicines listed below. If these are incorrect, please call your family doctor or clinic.        Dose / Directions Last dose taken    acetaminophen 325 MG tablet   Commonly known as:  TYLENOL   Dose:  325-650 mg        Take 325-650 mg by mouth   Refills:  0        albuterol (2.5 MG/3ML) 0.083% neb solution   Dose:  1 ampule   Quantity:  30 vial        Take 3 mLs (2.5 mg) by nebulization every 6 hours as  needed for shortness of breath / dyspnea   Refills:  0        BIOTENE PBF DRY MOUTH Pste        use twice daily   Refills:  0        bismuth subsalicylate 262 MG/15ML suspension   Commonly known as:  PEPTO BISMOL   Dose:  15-30 mL        Take 15-30 mLs by mouth   Refills:  0        BUTT PASTE - REGULAR   Commonly known as:  DR VIANEY CALZADA GOOP BUTT PASTE FORMULA   Quantity:  16 oz        Apply topically 2 times daily Apply to buttocks wound BID and prn.   Refills:  1        DEPAKOTE SPRINKLES 125 MG capsule   Generic drug:  divalproex        2 CAPSULES three times DAILY   Refills:  0        docosanol 10 % Crea cream   Commonly known as:  ABREVA   Dose:  1 Application        Apply 1 Application topically   Refills:  0        EAR DROPS 6.5 % otic solution   Dose:  5-10 drop   Generic drug:  carbamide peroxide        Place 5-10 drops in ear(s)   Refills:  0        ipratropium - albuterol 0.5 mg/2.5 mg/3 mL 0.5-2.5 (3) MG/3ML neb solution   Commonly known as:  DUONEB   Dose:  3 mL   Quantity:  360 mL        Take 1 vial (3 mLs) by nebulization every 6 hours   Refills:  0        loratadine 10 MG tablet   Commonly known as:  CLARITIN        Refills:  0        MELATONIN PO        Refills:  0        MIRALAX powder   Dose:  1 capful   Quantity:  510 g   Generic drug:  polyethylene glycol        Take 17 g by mouth daily.   Refills:  1        nystatin cream   Commonly known as:  MYCOSTATIN   Dose:  1 Application        Apply 1 Application topically   Refills:  0        * order for DME   Quantity:  1 Device        Equipment being ordered: bed rails   Refills:  0        * order for DME   Quantity:  2 Device        Equipment being ordered: nebulizer mask   Refills:  1        * order for DME   Quantity:  1 Box        Equipment being ordered: Interdry Roll   Refills:  3        * order for DME   Quantity:  1 Box        Equipment being ordered: Interdry roll.  Apply to buttock crease daily and PRN   Refills:  3        * order for DME    Quantity:  1 Box        Equipment being ordered: 4x6 tegaderm  - Apply to buttock daily   Refills:  3        * order for DME   Quantity:  1 Box        Equipment being ordered: Nu skin - apply to sacral area daily and prn   Refills:  3        * order for DME        coloplast Critic Aid skin paste, thick moisture barrier paste, apply to buttocks twice daily and as needed.   Refills:  0        RA CALAMINE 6.971-6.971 % Lotn   Dose:  1 Application        Apply 1 Application topically   Refills:  0        THICK-IT #2 PO        Add to all liquids   Refills:  0        * Notice:  This list has 7 medication(s) that are the same as other medications prescribed for you. Read the directions carefully, and ask your doctor or other care provider to review them with you.            Procedures and tests performed during your visit     CBC with platelets differential    CRP inflammation    Cardiac Continuous Monitoring    Comprehensive metabolic panel    Draw and hold blood cultures    Lactic acid whole blood    Nt probnp inpatient (BNP)    Peripheral IV catheter    XR Chest Port 1 View      Orders Needing Specimen Collection     None      Pending Results     No orders found from 7/29/2018 to 8/1/2018.            Pending Culture Results     No orders found from 7/29/2018 to 8/1/2018.            Pending Results Instructions     If you had any lab results that were not finalized at the time of your Discharge, you can call the ED Lab Result RN at [phone]. You will be contacted by this team for any positive Lab results or changes in treatment. The nurses are available 7 days a week from 10A to 6:30P.  You can leave a message 24 hours per day and they will return your call.        Thank you for choosing [facility]       Thank you for choosing [facility] for your care. Our goal is always to provide you with excellent care. Hearing back from our patients is one way we can continue to improve our services. Please take a few minutes to  complete the written survey that you may receive in the mail after you visit with us. Thank you!        Care EveryWhere ID     This is your Care EveryWhere ID. This could be used by other organizations to access your [facility] medical records  [id number]        Equal Access to Services     DA MCNAIR : Armida Reyes, brennan paez, angeles zarate. So [facility] [phone].    ATENCIÓN: Si habla español, tiene a day disposición servicios gratuitos de asistencia lingüística. Llame al [phone].    We comply with applicable federal civil rights laws and [place] laws. We do not discriminate on the basis of race, color, national origin, age, disability, sex, sexual orientation, or gender identity.            After Visit Summary       This is your record. Keep this with you and show to your community pharmacist(s) and doctor(s) at your next visit.

## 2019-10-24 NOTE — PLAN OF CARE
Problem: Patient Care Overview  Goal: Plan of Care/Patient Progress Review  Outcome: No Change  S-(situation): end of shift    B-(background): pneumonia    A-(assessment): VSS.  500ml bolus x1 and restarted IVF at 50/hour. On RA most of shift- desats to 84% when positioned on right side and needs 0.5l/nc to maintain sats above 88%.  Up to chair for 2 hours.  Was fed both meals and ate 50-75%.  Admin rectal tylenol x1 for temp 100.3.  IV antibiotics changed to oral.  Patient chewing on right hand continuously and a care provider who was present reports this is a normal behavior and they do not stop her from doing it but try to remind her not to and she reports this behavior causes sores on hand and mouth and chin area in the past.      R-(recommendations): monitor         How Severe Is This Condition?: mild